# Patient Record
Sex: FEMALE | Race: ASIAN | NOT HISPANIC OR LATINO
[De-identification: names, ages, dates, MRNs, and addresses within clinical notes are randomized per-mention and may not be internally consistent; named-entity substitution may affect disease eponyms.]

---

## 2017-03-13 ENCOUNTER — TRANSCRIPTION ENCOUNTER (OUTPATIENT)
Age: 27
End: 2017-03-13

## 2017-10-14 ENCOUNTER — TRANSCRIPTION ENCOUNTER (OUTPATIENT)
Age: 27
End: 2017-10-14

## 2018-08-10 ENCOUNTER — TRANSCRIPTION ENCOUNTER (OUTPATIENT)
Age: 28
End: 2018-08-10

## 2019-01-10 ENCOUNTER — EMERGENCY (EMERGENCY)
Facility: HOSPITAL | Age: 29
LOS: 1 days | Discharge: HOME | End: 2019-01-10
Attending: EMERGENCY MEDICINE
Payer: COMMERCIAL

## 2019-01-10 VITALS
HEART RATE: 89 BPM | OXYGEN SATURATION: 100 % | SYSTOLIC BLOOD PRESSURE: 122 MMHG | DIASTOLIC BLOOD PRESSURE: 79 MMHG | WEIGHT: 125 LBS | RESPIRATION RATE: 20 BRPM | TEMPERATURE: 98 F

## 2019-01-10 VITALS
HEART RATE: 70 BPM | RESPIRATION RATE: 18 BRPM | SYSTOLIC BLOOD PRESSURE: 110 MMHG | TEMPERATURE: 98 F | OXYGEN SATURATION: 99 % | DIASTOLIC BLOOD PRESSURE: 72 MMHG

## 2019-01-10 LAB
ALBUMIN SERPL ELPH-MCNC: 4.8 G/DL — SIGNIFICANT CHANGE UP (ref 3.3–5)
ALP SERPL-CCNC: 50 U/L — SIGNIFICANT CHANGE UP (ref 40–120)
ALT FLD-CCNC: 10 U/L — SIGNIFICANT CHANGE UP (ref 10–45)
ANION GAP SERPL CALC-SCNC: 12 MMOL/L — SIGNIFICANT CHANGE UP (ref 5–17)
APPEARANCE UR: CLEAR — SIGNIFICANT CHANGE UP
APTT BLD: 36.8 SEC — HIGH (ref 27.5–36.3)
AST SERPL-CCNC: 13 U/L — SIGNIFICANT CHANGE UP (ref 10–40)
BACTERIA # UR AUTO: NEGATIVE — SIGNIFICANT CHANGE UP
BASOPHILS # BLD AUTO: 0.1 K/UL — SIGNIFICANT CHANGE UP (ref 0–0.2)
BASOPHILS NFR BLD AUTO: 0.6 % — SIGNIFICANT CHANGE UP (ref 0–2)
BILIRUB SERPL-MCNC: 0.2 MG/DL — SIGNIFICANT CHANGE UP (ref 0.2–1.2)
BILIRUB UR-MCNC: NEGATIVE — SIGNIFICANT CHANGE UP
BUN SERPL-MCNC: 9 MG/DL — SIGNIFICANT CHANGE UP (ref 7–23)
CALCIUM SERPL-MCNC: 9.6 MG/DL — SIGNIFICANT CHANGE UP (ref 8.4–10.5)
CHLORIDE SERPL-SCNC: 103 MMOL/L — SIGNIFICANT CHANGE UP (ref 96–108)
CO2 SERPL-SCNC: 23 MMOL/L — SIGNIFICANT CHANGE UP (ref 22–31)
COLOR SPEC: COLORLESS — SIGNIFICANT CHANGE UP
CREAT SERPL-MCNC: 0.74 MG/DL — SIGNIFICANT CHANGE UP (ref 0.5–1.3)
DIFF PNL FLD: NEGATIVE — SIGNIFICANT CHANGE UP
EOSINOPHIL # BLD AUTO: 0 K/UL — SIGNIFICANT CHANGE UP (ref 0–0.5)
EOSINOPHIL NFR BLD AUTO: 0.4 % — SIGNIFICANT CHANGE UP (ref 0–6)
EPI CELLS # UR: 1 /HPF — SIGNIFICANT CHANGE UP
GLUCOSE SERPL-MCNC: 93 MG/DL — SIGNIFICANT CHANGE UP (ref 70–99)
GLUCOSE UR QL: NEGATIVE — SIGNIFICANT CHANGE UP
HCG UR QL: NEGATIVE — SIGNIFICANT CHANGE UP
HCT VFR BLD CALC: 41.7 % — SIGNIFICANT CHANGE UP (ref 34.5–45)
HGB BLD-MCNC: 13.6 G/DL — SIGNIFICANT CHANGE UP (ref 11.5–15.5)
HYALINE CASTS # UR AUTO: 0 /LPF — SIGNIFICANT CHANGE UP (ref 0–2)
INR BLD: 1.02 RATIO — SIGNIFICANT CHANGE UP (ref 0.88–1.16)
KETONES UR-MCNC: NEGATIVE — SIGNIFICANT CHANGE UP
LEUKOCYTE ESTERASE UR-ACNC: NEGATIVE — SIGNIFICANT CHANGE UP
LIDOCAIN IGE QN: 30 U/L — SIGNIFICANT CHANGE UP (ref 7–60)
LYMPHOCYTES # BLD AUTO: 18.5 % — SIGNIFICANT CHANGE UP (ref 13–44)
LYMPHOCYTES # BLD AUTO: 2 K/UL — SIGNIFICANT CHANGE UP (ref 1–3.3)
MCHC RBC-ENTMCNC: 26.8 PG — LOW (ref 27–34)
MCHC RBC-ENTMCNC: 32.5 GM/DL — SIGNIFICANT CHANGE UP (ref 32–36)
MCV RBC AUTO: 82.7 FL — SIGNIFICANT CHANGE UP (ref 80–100)
MONOCYTES # BLD AUTO: 0.2 K/UL — SIGNIFICANT CHANGE UP (ref 0–0.9)
MONOCYTES NFR BLD AUTO: 2.3 % — SIGNIFICANT CHANGE UP (ref 2–14)
NEUTROPHILS # BLD AUTO: 8.4 K/UL — HIGH (ref 1.8–7.4)
NEUTROPHILS NFR BLD AUTO: 78.2 % — HIGH (ref 43–77)
NITRITE UR-MCNC: NEGATIVE — SIGNIFICANT CHANGE UP
PH UR: 6 — SIGNIFICANT CHANGE UP (ref 5–8)
PLATELET # BLD AUTO: 508 K/UL — HIGH (ref 150–400)
POTASSIUM SERPL-MCNC: 4 MMOL/L — SIGNIFICANT CHANGE UP (ref 3.5–5.3)
POTASSIUM SERPL-SCNC: 4 MMOL/L — SIGNIFICANT CHANGE UP (ref 3.5–5.3)
PROT SERPL-MCNC: 8.4 G/DL — HIGH (ref 6–8.3)
PROT UR-MCNC: NEGATIVE — SIGNIFICANT CHANGE UP
PROTHROM AB SERPL-ACNC: 11.7 SEC — SIGNIFICANT CHANGE UP (ref 10–12.9)
RBC # BLD: 5.05 M/UL — SIGNIFICANT CHANGE UP (ref 3.8–5.2)
RBC # FLD: 12.2 % — SIGNIFICANT CHANGE UP (ref 10.3–14.5)
RBC CASTS # UR COMP ASSIST: 1 /HPF — SIGNIFICANT CHANGE UP (ref 0–4)
SODIUM SERPL-SCNC: 138 MMOL/L — SIGNIFICANT CHANGE UP (ref 135–145)
SP GR SPEC: 1.01 — SIGNIFICANT CHANGE UP (ref 1.01–1.02)
UROBILINOGEN FLD QL: NEGATIVE — SIGNIFICANT CHANGE UP
WBC # BLD: 10.7 K/UL — HIGH (ref 3.8–10.5)
WBC # FLD AUTO: 10.7 K/UL — HIGH (ref 3.8–10.5)
WBC UR QL: 1 /HPF — SIGNIFICANT CHANGE UP (ref 0–5)

## 2019-01-10 PROCEDURE — 74177 CT ABD & PELVIS W/CONTRAST: CPT

## 2019-01-10 PROCEDURE — 96374 THER/PROPH/DIAG INJ IV PUSH: CPT | Mod: XU

## 2019-01-10 PROCEDURE — 85730 THROMBOPLASTIN TIME PARTIAL: CPT

## 2019-01-10 PROCEDURE — 81025 URINE PREGNANCY TEST: CPT

## 2019-01-10 PROCEDURE — 85027 COMPLETE CBC AUTOMATED: CPT

## 2019-01-10 PROCEDURE — 96361 HYDRATE IV INFUSION ADD-ON: CPT

## 2019-01-10 PROCEDURE — 83690 ASSAY OF LIPASE: CPT

## 2019-01-10 PROCEDURE — 99284 EMERGENCY DEPT VISIT MOD MDM: CPT | Mod: 25

## 2019-01-10 PROCEDURE — 87086 URINE CULTURE/COLONY COUNT: CPT

## 2019-01-10 PROCEDURE — 85610 PROTHROMBIN TIME: CPT

## 2019-01-10 PROCEDURE — 96375 TX/PRO/DX INJ NEW DRUG ADDON: CPT

## 2019-01-10 PROCEDURE — 74177 CT ABD & PELVIS W/CONTRAST: CPT | Mod: 26

## 2019-01-10 PROCEDURE — 99284 EMERGENCY DEPT VISIT MOD MDM: CPT

## 2019-01-10 PROCEDURE — 81001 URINALYSIS AUTO W/SCOPE: CPT

## 2019-01-10 PROCEDURE — 80053 COMPREHEN METABOLIC PANEL: CPT

## 2019-01-10 RX ORDER — ACETAMINOPHEN 500 MG
1000 TABLET ORAL ONCE
Qty: 0 | Refills: 0 | Status: COMPLETED | OUTPATIENT
Start: 2019-01-10 | End: 2019-01-10

## 2019-01-10 RX ORDER — SODIUM CHLORIDE 9 MG/ML
1000 INJECTION INTRAMUSCULAR; INTRAVENOUS; SUBCUTANEOUS ONCE
Qty: 0 | Refills: 0 | Status: COMPLETED | OUTPATIENT
Start: 2019-01-10 | End: 2019-01-10

## 2019-01-10 RX ORDER — KETOROLAC TROMETHAMINE 30 MG/ML
15 SYRINGE (ML) INJECTION ONCE
Qty: 0 | Refills: 0 | Status: DISCONTINUED | OUTPATIENT
Start: 2019-01-10 | End: 2019-01-10

## 2019-01-10 RX ADMIN — Medication 15 MILLIGRAM(S): at 16:30

## 2019-01-10 RX ADMIN — Medication 1000 MILLIGRAM(S): at 12:50

## 2019-01-10 RX ADMIN — Medication 400 MILLIGRAM(S): at 12:50

## 2019-01-10 RX ADMIN — Medication 15 MILLIGRAM(S): at 16:00

## 2019-01-10 RX ADMIN — Medication 1000 MILLIGRAM(S): at 14:22

## 2019-01-10 RX ADMIN — SODIUM CHLORIDE 1000 MILLILITER(S): 9 INJECTION INTRAMUSCULAR; INTRAVENOUS; SUBCUTANEOUS at 14:20

## 2019-01-10 RX ADMIN — SODIUM CHLORIDE 2000 MILLILITER(S): 9 INJECTION INTRAMUSCULAR; INTRAVENOUS; SUBCUTANEOUS at 12:47

## 2019-01-10 NOTE — ED PROVIDER NOTE - NSFOLLOWUPINSTRUCTIONS_ED_ALL_ED_FT
1. Follow up with your PMD in 24-48 hours.  2. Rest. Keep self hydrated,  bland diet of BRAT diet (bananas, rice, apple, tea). Avoid diary, avoid spicy/fatty foods. Follow up with GI for further evaluation.   3. Return to the ER for fever, chills, vomiting, unable to eat or drink or any other concerns.

## 2019-01-10 NOTE — ED PROVIDER NOTE - MEDICAL DECISION MAKING DETAILS
Attending Elvie Pryor: 29 y/o female presenting with llq pain. pocus shows no evidence of hydro making ureteral colic less likely. no dysuria to suggest pyelo or cystitis. no h/o IBS or Chrons. on PA exam no CMT and pt denies any vaginal discharge or risk factors for STD. will obtain ct scan to further evaluate, likely gi follow up

## 2019-01-10 NOTE — ED ADULT NURSE NOTE - OBJECTIVE STATEMENT
28 year old A&Ox3 female presents ambulatory to ED with chief complaint of worsening LLQ abdominal pain x 2 weeks. No PMH or daily medications. Patient states she gets frequent UTI's but the pain feels "different." Pain LLQ radiates to left lower back, -CVA tenderness. Confirms nausea, decrease in PO intake and recent use of laxatives because of "extreme constipation." Patient states she also noted "blood in her stool." Denies tarry stools. . Denies hematuria, burning upon urination, CP SOB, vomiting, fevers, chills, HA, blurry vision, weakness and fatigue. Abdomen soft nondistended bowel sounds heard in all quadrants, tender to palpation in LLQ and RLQ. VSS patient updated on plan of care. 28 year old A&Ox3 female presents ambulatory to ED with chief complaint of worsening LLQ abdominal pain x 2 weeks. No PMH or daily medications. Patient states she gets frequent UTI's but the pain feels "different." Pain LLQ radiates to left lower back, -CVA tenderness. LMP 1.5 weeks ago, normal period, no abnormal discharge. Confirms nausea, decrease in PO intake and recent use of laxatives because of "extreme constipation." Patient states she also noted "blood in her stool." Denies tarry stools. . Denies hematuria, burning upon urination, CP SOB, vomiting, fevers, chills, HA, blurry vision, weakness and fatigue. Abdomen soft nondistended bowel sounds heard in all quadrants, tender to palpation in LLQ and RLQ. VSS patient updated on plan of care. 28 year old A&Ox3 female presents ambulatory to ED with chief complaint of worsening LLQ abdominal pain x 2 weeks. No PMH or daily medications. Patient states she gets frequent UTI's but the pain feels "different." Pain LLQ radiates to left lower back, -CVA tenderness. LMP 1.5 weeks ago, normal period, no abnormal discharge. Confirms nausea, decrease in PO intake and recent use of laxatives because of "extreme constipation." Patient states she also noted "blood in her stool." Denies tarry stools. . Denies hematuria, burning upon urination, CP SOB, vomiting, fevers, chills, HA, blurry vision, weakness and fatigue. Abdomen soft nondistended bowel sounds heard in all quadrants, tender to palpation in LLQ and RLQ. VSS, declines pain medication at this time. Patient updated on plan of care.

## 2019-01-10 NOTE — ED PROVIDER NOTE - OBJECTIVE STATEMENT
28yof no pmhx here with 2 weeks of intermittent LLQ pain associated with constipation. pt reports today on the train to [facility] when had severe "knife stabbing and twisting" pain with the urge to urinate and have BM together. No fever or chills. no nausea or vomiting. currently the pain is 5/10. pt reports hx of constipation and the need to take laxatives intermittently to relieve it but since this 2 weeks, unable to have "good BM". no surgical hx. sexually active with spouse only. no hx of STD. no vaginal discharge or dyspareunia.

## 2019-01-10 NOTE — ED PROVIDER NOTE - PROGRESS NOTE DETAILS
Attending Elvie Pryor: ct scan negative for acute pathology. no evidence of sig ovarian cyst making torsion less likely. pt well appearing. will give GI follow up

## 2019-01-10 NOTE — ED ADULT TRIAGE NOTE - PAIN: PRESENCE, MLM
HH re-certification with [facility], Dr.Mohammed Horton. Patient received SN services.  
complains of pain/discomfort

## 2019-01-10 NOTE — ED PROVIDER NOTE - PHYSICAL EXAMINATION
Attending Elvie Pryor: Gen: NAD, heent: atrauamtic, eomi, perrla, mmm, op pink, uvula midline, neck; nttp, no nuchal rigidity, chest: nttp, no crepitus, cv: rrr, no murmurs, lungs: ctab, abd: soft, mild ttp llq, no peritoneal signs, +BS, no guarding, ext: wwp, neg homans, skin: no rash, neuro: awake and alert, following commands, speech clear, sensation and strength intact, no focal deficits

## 2019-01-10 NOTE — ED ADULT NURSE NOTE - CHPI ED NUR SYMPTOMS NEG
no burning urination/no abdominal distension/no chills/no fever/no diarrhea/no dysuria/no hematuria/no vomiting

## 2019-01-10 NOTE — ED PROVIDER NOTE - ATTENDING CONTRIBUTION TO CARE
Attending MD Elvie Pryor:   I personally have seen and examined this patient.  Physician assistant note reviewed and agree on plan of care and except where noted.  See HPI, PE, and MDM for details.

## 2019-01-11 LAB
CULTURE RESULTS: SIGNIFICANT CHANGE UP
SPECIMEN SOURCE: SIGNIFICANT CHANGE UP

## 2019-01-12 NOTE — ED POST DISCHARGE NOTE - DETAILS
Left message for c/b to discuss contaminated urine and to f/u with PMD for repeat urine culture. -Shawna Garza PA-C Spoke to patient. Informed of results. Currently asymptomatic, advised to follow up for repeat testing for onset urinary symptoms Reba Merino PA-C

## 2021-09-16 ENCOUNTER — APPOINTMENT (OUTPATIENT)
Dept: ANTEPARTUM | Facility: CLINIC | Age: 31
End: 2021-09-16
Payer: COMMERCIAL

## 2021-09-16 ENCOUNTER — ASOB RESULT (OUTPATIENT)
Age: 31
End: 2021-09-16

## 2021-09-16 PROCEDURE — 76801 OB US < 14 WKS SINGLE FETUS: CPT | Mod: 59

## 2021-09-16 PROCEDURE — 76813 OB US NUCHAL MEAS 1 GEST: CPT

## 2021-09-17 ENCOUNTER — LABORATORY RESULT (OUTPATIENT)
Age: 31
End: 2021-09-17

## 2021-10-29 ENCOUNTER — ASOB RESULT (OUTPATIENT)
Age: 31
End: 2021-10-29

## 2021-10-29 ENCOUNTER — APPOINTMENT (OUTPATIENT)
Dept: ANTEPARTUM | Facility: CLINIC | Age: 31
End: 2021-10-29
Payer: COMMERCIAL

## 2021-10-29 PROCEDURE — 76811 OB US DETAILED SNGL FETUS: CPT

## 2021-10-29 PROCEDURE — 99204 OFFICE O/P NEW MOD 45 MIN: CPT | Mod: 25

## 2021-11-10 ENCOUNTER — APPOINTMENT (OUTPATIENT)
Dept: ANTEPARTUM | Facility: CLINIC | Age: 31
End: 2021-11-10

## 2021-11-11 ENCOUNTER — APPOINTMENT (OUTPATIENT)
Dept: ANTEPARTUM | Facility: CLINIC | Age: 31
End: 2021-11-11

## 2021-11-15 ENCOUNTER — APPOINTMENT (OUTPATIENT)
Dept: ANTEPARTUM | Facility: CLINIC | Age: 31
End: 2021-11-15
Payer: COMMERCIAL

## 2021-11-15 ENCOUNTER — APPOINTMENT (OUTPATIENT)
Dept: ANTEPARTUM | Facility: CLINIC | Age: 31
End: 2021-11-15

## 2021-11-15 ENCOUNTER — ASOB RESULT (OUTPATIENT)
Age: 31
End: 2021-11-15

## 2021-11-15 PROCEDURE — 76816 OB US FOLLOW-UP PER FETUS: CPT

## 2021-11-16 ENCOUNTER — APPOINTMENT (OUTPATIENT)
Dept: ANTEPARTUM | Facility: CLINIC | Age: 31
End: 2021-11-16

## 2021-11-29 ENCOUNTER — TRANSCRIPTION ENCOUNTER (OUTPATIENT)
Age: 31
End: 2021-11-29

## 2022-01-05 ENCOUNTER — APPOINTMENT (OUTPATIENT)
Dept: ANTEPARTUM | Facility: CLINIC | Age: 32
End: 2022-01-05
Payer: COMMERCIAL

## 2022-01-05 ENCOUNTER — ASOB RESULT (OUTPATIENT)
Age: 32
End: 2022-01-05

## 2022-01-05 PROCEDURE — 76816 OB US FOLLOW-UP PER FETUS: CPT

## 2022-01-28 ENCOUNTER — ASOB RESULT (OUTPATIENT)
Age: 32
End: 2022-01-28

## 2022-01-28 ENCOUNTER — APPOINTMENT (OUTPATIENT)
Dept: ANTEPARTUM | Facility: CLINIC | Age: 32
End: 2022-01-28
Payer: COMMERCIAL

## 2022-01-28 PROCEDURE — 76816 OB US FOLLOW-UP PER FETUS: CPT

## 2022-02-22 ENCOUNTER — APPOINTMENT (OUTPATIENT)
Dept: ANTEPARTUM | Facility: CLINIC | Age: 32
End: 2022-02-22
Payer: COMMERCIAL

## 2022-02-22 ENCOUNTER — ASOB RESULT (OUTPATIENT)
Age: 32
End: 2022-02-22

## 2022-02-22 PROCEDURE — 76816 OB US FOLLOW-UP PER FETUS: CPT

## 2022-03-18 ENCOUNTER — INPATIENT (INPATIENT)
Facility: HOSPITAL | Age: 32
LOS: 2 days | Discharge: HOME | End: 2022-03-21
Attending: OBSTETRICS & GYNECOLOGY | Admitting: OBSTETRICS & GYNECOLOGY
Payer: COMMERCIAL

## 2022-03-18 VITALS — TEMPERATURE: 99 F

## 2022-03-18 DIAGNOSIS — O26.899 OTHER SPECIFIED PREGNANCY RELATED CONDITIONS, UNSPECIFIED TRIMESTER: ICD-10-CM

## 2022-03-18 DIAGNOSIS — Z34.80 ENCOUNTER FOR SUPERVISION OF OTHER NORMAL PREGNANCY, UNSPECIFIED TRIMESTER: ICD-10-CM

## 2022-03-18 DIAGNOSIS — Z3A.00 WEEKS OF GESTATION OF PREGNANCY NOT SPECIFIED: ICD-10-CM

## 2022-03-18 DIAGNOSIS — Z3A.39 39 WEEKS GESTATION OF PREGNANCY: ICD-10-CM

## 2022-03-18 LAB
BASOPHILS # BLD AUTO: 0.05 K/UL — SIGNIFICANT CHANGE UP (ref 0–0.2)
BASOPHILS NFR BLD AUTO: 0.4 % — SIGNIFICANT CHANGE UP (ref 0–2)
BLD GP AB SCN SERPL QL: NEGATIVE — SIGNIFICANT CHANGE UP
EOSINOPHIL # BLD AUTO: 0.18 K/UL — SIGNIFICANT CHANGE UP (ref 0–0.5)
EOSINOPHIL NFR BLD AUTO: 1.4 % — SIGNIFICANT CHANGE UP (ref 0–6)
HCT VFR BLD CALC: 40.5 % — SIGNIFICANT CHANGE UP (ref 34.5–45)
HGB BLD-MCNC: 12.8 G/DL — SIGNIFICANT CHANGE UP (ref 11.5–15.5)
IMM GRANULOCYTES NFR BLD AUTO: 0.8 % — SIGNIFICANT CHANGE UP (ref 0–1.5)
LYMPHOCYTES # BLD AUTO: 23.7 % — SIGNIFICANT CHANGE UP (ref 13–44)
LYMPHOCYTES # BLD AUTO: 3.13 K/UL — SIGNIFICANT CHANGE UP (ref 1–3.3)
MCHC RBC-ENTMCNC: 26.4 PG — LOW (ref 27–34)
MCHC RBC-ENTMCNC: 31.6 GM/DL — LOW (ref 32–36)
MCV RBC AUTO: 83.7 FL — SIGNIFICANT CHANGE UP (ref 80–100)
MONOCYTES # BLD AUTO: 1.32 K/UL — HIGH (ref 0–0.9)
MONOCYTES NFR BLD AUTO: 10 % — SIGNIFICANT CHANGE UP (ref 2–14)
NEUTROPHILS # BLD AUTO: 8.45 K/UL — HIGH (ref 1.8–7.4)
NEUTROPHILS NFR BLD AUTO: 63.7 % — SIGNIFICANT CHANGE UP (ref 43–77)
NRBC # BLD: 0 /100 WBCS — SIGNIFICANT CHANGE UP (ref 0–0)
PLATELET # BLD AUTO: 384 K/UL — SIGNIFICANT CHANGE UP (ref 150–400)
RBC # BLD: 4.84 M/UL — SIGNIFICANT CHANGE UP (ref 3.8–5.2)
RBC # FLD: 18.5 % — HIGH (ref 10.3–14.5)
RH IG SCN BLD-IMP: NEGATIVE — SIGNIFICANT CHANGE UP
RH IG SCN BLD-IMP: NEGATIVE — SIGNIFICANT CHANGE UP
SARS-COV-2 RNA SPEC QL NAA+PROBE: SIGNIFICANT CHANGE UP
WBC # BLD: 13.23 K/UL — HIGH (ref 3.8–10.5)
WBC # FLD AUTO: 13.23 K/UL — HIGH (ref 3.8–10.5)

## 2022-03-18 RX ORDER — INFLUENZA VIRUS VACCINE 15; 15; 15; 15 UG/.5ML; UG/.5ML; UG/.5ML; UG/.5ML
0.5 SUSPENSION INTRAMUSCULAR ONCE
Refills: 0 | Status: DISCONTINUED | OUTPATIENT
Start: 2022-03-18 | End: 2022-03-21

## 2022-03-18 RX ORDER — SODIUM CHLORIDE 9 MG/ML
1000 INJECTION, SOLUTION INTRAVENOUS
Refills: 0 | Status: DISCONTINUED | OUTPATIENT
Start: 2022-03-18 | End: 2022-03-20

## 2022-03-18 RX ORDER — CITRIC ACID/SODIUM CITRATE 300-500 MG
15 SOLUTION, ORAL ORAL EVERY 6 HOURS
Refills: 0 | Status: DISCONTINUED | OUTPATIENT
Start: 2022-03-18 | End: 2022-03-20

## 2022-03-18 RX ORDER — OXYTOCIN 10 UNIT/ML
333.33 VIAL (ML) INJECTION
Qty: 20 | Refills: 0 | Status: DISCONTINUED | OUTPATIENT
Start: 2022-03-18 | End: 2022-03-21

## 2022-03-18 NOTE — OB PROVIDER H&P - ASSESSMENT
A/P: 31yoF  @39 weeks NATALIA 3/25/22 admitted for IOL secondary to abnormal BPP (). Cat 1 tracing.   - Admit to L&D  - Routine Labs. IVF. Covid swab  - EFM: Cat 1, continuous monitoring   - IOL with BC  - For CB when able    - GBS negative   - Anesthesia consult - pt desires epidural   - D/w Dr. Deena Starks, [facility]

## 2022-03-18 NOTE — OB RN PATIENT PROFILE - BREASTFEEDING IS BETTER ESTABLISHED WHEN INFANTS ARE ROOMING IN WITH PARENT (23/24 HOURS OF THE DAY)
PD HPI SKIN





- Stated complaint


Stated Complaint: BLEEDING INCISION/ POST 





- Chief complaint


Chief Complaint: Wound





- History obtained from


History obtained from: Patient





- History of Present Illness


Timing - onset: Today


Timing - details: Abrupt onset


Pain level now: 0


Location: Abdomen


Quality / character: Other (bleeding).  No: Itchy, Painful, Burning, Discolored,

Raised, Vesicular, Crusted, Swelling


Associated symptoms: No: Fever, Abd pain


Similar symptoms before: Has not had sx before


Recently seen: Surgery





- Additional information


Additional information: 





bleeding from incision site tonight when bathing her child. bleeding is from 

left lateral aspect of  site. patient had  .





Review of Systems


Constitutional: reports: Reviewed and negative


Skin: reports: Laceration (s)


Endocrine: denies: Easy bruising / bleeding





PD PAST MEDICAL HISTORY





- Past Medical History


Past Medical History: Yes


Cardiovascular: None


Respiratory: None


Neuro: None


Endocrine/Autoimmune: None


GI: GERD


GYN: None


: None


HEENT: None


Psych: None


Musculoskeletal: None


Derm: None





- Past Surgical History


Past Surgical History: Yes


/GYN:  section


HEENT: Tonsil/Adenoidectomy





- Present Medications


Home Medications: 


                                Ambulatory Orders











 Medication  Instructions  Recorded  Confirmed


 


Amox/Clav 875/125 [Augmentin] 1 each PO Q12H #14 tablet 18 


 


Hydrocodone/Acetaminophen 1 - 2 each PO Q6H PRN #14 tablet 18 





[Hydrocodon-Acetaminophen 5-325]   














- Allergies


Allergies/Adverse Reactions: 


                                    Allergies











Allergy/AdvReac Type Severity Reaction Status Date / Time


 


codeine Allergy  Hives Verified 20 23:32














- Social History


Does the pt smoke?: No


Smoking Status: Never smoker


Does the pt drink ETOH?: Yes


Does the pt have substance abuse?: No





- Immunizations


Immunizations are current?: Yes





- POLST


Patient has POLST: No





PD ED PE NORMAL





- Vitals


Vital signs reviewed: Yes





- General


General: Alert and oriented X 3, No acute distress, Well developed/nourished





- Abdomen


Abdomen: Soft, Non tender, Other (left-most 3-4 cm of  incision site 

has brisk, non-pulsatile dark red beeding from wound)





Results





- Vitals


Vitals: 





                                     Oxygen











O2 Source                      Room air

















PD MEDICAL DECISION MAKING





- ED course


Complexity details: considered differential, d/w patient, d/w family


ED course: 





D/W Dr. Bennett, recommends silver nitrate to cauterize wound and then steri-

strip entire wound. 


I injected 2%lidocaine with epinephrine into edges of wound and then applied 

three layers of silver nitrate to the bleeding edges of the wound, resulting in 

good hemostasis. strei strip then applied by tech to entire wound





Departure





- Departure


Disposition: 01 Home, Self Care


Clinical Impression: 


 Postoperative bleeding from incision





Condition: Good


Instructions:  ED Wound Check Post Op Bleeding


Follow-Up: 


Danna Bennett MD [Provider Admit Priv/Credential] - 


Discharge Date/Time: 20 04:52
Statement Selected

## 2022-03-18 NOTE — OB RN PATIENT PROFILE - FALL HARM RISK - UNIVERSAL INTERVENTIONS
Bed in lowest position, wheels locked, appropriate side rails in place/Call bell, personal items and telephone in reach/Instruct patient to call for assistance before getting out of bed or chair/Non-slip footwear when patient is out of bed/[place] to call system/Physically safe environment - no spills, clutter or unnecessary equipment/Purposeful Proactive Rounding/Room/bathroom lighting operational, light cord in reach Bed in lowest position, wheels locked, appropriate side rails in place/Call bell, personal items and telephone in reach/Instruct patient to call for assistance before getting out of bed or chair/Non-slip footwear when patient is out of bed/[place] to call system/Physically safe environment - no spills, clutter or unnecessary equipment/Purposeful Proactive Rounding/Room/bathroom lighting operational, light cord in reach

## 2022-03-18 NOTE — OB PROVIDER H&P - ATTENDING COMMENTS
I agree with SIMRAN Starks's assessment and plan.  Pt was seen in office today and c/o decreased fetal movement.  BPP was done in office by Dr Domingo which was 6/8 with -2 for lack of fetal breathing.  Given that pt is 39 wks with nonreassuring fetal testing will proceed with induction of labor for BPP 6/8.  Plan was discussed with pt.  Admit to L+D  IOL with buccal cytotec  CB placement when able  analgesia prn    ALEXIS Scales

## 2022-03-18 NOTE — OB RN TRIAGE NOTE - FALL HARM RISK - UNIVERSAL INTERVENTIONS
Bed in lowest position, wheels locked, appropriate side rails in place/Call bell, personal items and telephone in reach/Instruct patient to call for assistance before getting out of bed or chair/Non-slip footwear when patient is out of bed/[place] to call system/Physically safe environment - no spills, clutter or unnecessary equipment/Purposeful Proactive Rounding/Room/bathroom lighting operational, light cord in reach

## 2022-03-18 NOTE — OB PROVIDER H&P - HISTORY OF PRESENT ILLNESS
OB PA Admission H&P    31yoF  @39 weeks NATALIA 3/25/22 presents to triage from office with BPP 6/8 (no practice breathing). Pt endorses +FM. Denies -LOF. -CTXs. -VB. Pt denies any other concerns.    – PNC: GBS negative. EFW 3250  – OBHx: primigravid   – GynHx: denies h/o fibroids, ovarian cysts, abnl pap smears, STDs  – PMH: ?thrombocytosis (platelets 565/during pregnancy) denies h/o HTN, DM, asthma, thyroid disorders, h/o blood transfusions   – PSH: denies  – Psych: denies anxiety/depression   – Social: denies alcohol/tobacco/drug use in pregnancy   – Meds: PNV   – Allergies: NKDA  – Will accept blood transfusions: Yes      Vital Signs Last 24 Hrs  HR: 100 (18 Mar 2022 16:31) (100 - 100)  BP: 113/55 (18 Mar 2022 16:31) (113/55 - 113/55)  RR: 18 (18 Mar 2022 16:31) (18 - 18)      Gen: NAD  CV: RRR  Lungs: CTA b/l   Abd: gravid, non-tender  Ext: BLE non-edematous, no calf tenderness b/l       – VE: 0/0/-3  – FHT: baseline 125, mod variability, +accels, -decels  – [place]: irregular   – EFW: 3250   – Sono: vertex

## 2022-03-18 NOTE — OB RN PATIENT PROFILE - ALERT: PERTINENT HISTORY
1st Trimester Sonogram/20 Week Level II Sonogram/BioPhysical Profile(s) 1st Trimester Sonogram/20 Week Level II Sonogram/BioPhysical Profile(s)/Ultra Screen at 12 Weeks

## 2022-03-19 LAB
COVID-19 SPIKE DOMAIN AB INTERP: POSITIVE
COVID-19 SPIKE DOMAIN ANTIBODY RESULT: >250 U/ML — HIGH
SARS-COV-2 IGG+IGM SERPL QL IA: >250 U/ML — HIGH
SARS-COV-2 IGG+IGM SERPL QL IA: POSITIVE
T PALLIDUM AB TITR SER: NEGATIVE — SIGNIFICANT CHANGE UP

## 2022-03-19 RX ORDER — OXYTOCIN 10 UNIT/ML
VIAL (ML) INJECTION
Qty: 30 | Refills: 0 | Status: DISCONTINUED | OUTPATIENT
Start: 2022-03-19 | End: 2022-03-20

## 2022-03-19 RX ADMIN — Medication 2 MILLIUNIT(S)/MIN: at 20:54

## 2022-03-19 NOTE — OB RN DELIVERY SUMMARY - NSSELHIDDEN_OBGYN_ALL_OB_FT
[NS_DeliveryAttending1_OBGYN_ALL_OB_FT:[id number]],[NS_DeliveryAssist1_OBGYN_ALL_OB_FT:[id number]],[NS_DeliveryRN_OBGYN_ALL_OB_FT:[id number]]

## 2022-03-19 NOTE — PRE-ANESTHESIA EVALUATION ADULT - NSANTHASARD_GEN_ALL_CORE
Problem: Falls - Risk of:  Goal: Will remain free from falls  Will remain free from falls   Outcome: Met This Shift
Problem: Pain:  Goal: Control of acute pain  Control of acute pain   Outcome: Met This Shift
2E
2E

## 2022-03-19 NOTE — OB RN DELIVERY SUMMARY - NS_SEPSISRSKCALC_OBGYN_ALL_OB_FT
EOS calculated successfully. EOS Risk Factor: 0.5/1000 live births ([facility] national incidence); GA=39w2d; Temp=98.8; ROM=5.617; GBS='Negative'; Antibiotics='No antibiotics or any antibiotics < 2 hrs prior to birth'

## 2022-03-19 NOTE — PRE-ANESTHESIA EVALUATION ADULT - NSANTHOSAYNRD_GEN_A_CORE
No. MEAGHAN screening performed.  STOP BANG Legend: 0-2 = LOW Risk; 3-4 = INTERMEDIATE Risk; 5-8 = HIGH Risk
No. MEAGHAN screening performed.  STOP BANG Legend: 0-2 = LOW Risk; 3-4 = INTERMEDIATE Risk; 5-8 = HIGH Risk

## 2022-03-19 NOTE — OB PROVIDER LABOR PROGRESS NOTE - NS_SUBJECTIVE/OBJECTIVE_OBGYN_ALL_OB_FT
Pt seen for placement of cervical aguillon balloon with sterile techniques; 60cc NS instilled into uterine/vaginal balloons; placed w/o incidence; pt tolerated procedure well
pt is well.  adequate pain relief
Pt evaluated for cervical change

## 2022-03-19 NOTE — OB PROVIDER LABOR PROGRESS NOTE - ASSESSMENT
A/P:  -continue current management    
a/p:  Primagravida at term  IOL due to borderline antepartum testing results, and decreased fetal movements  Pt has a cervical balloon in place currently, and receiving buccal misoprostol.  Pt and her  were fully counseled re IOL process and all of their q's were answered.
called for epidural  cw buccal cytotec    dw Dr.Davidson Melvin Black PGY3

## 2022-03-19 NOTE — OB RN DELIVERY SUMMARY - NS_LABORCHARACTER_OBGYN_ALL_OB
Induction of labor-Medicinal/External electronic FM Induction of labor-AROM/Induction of labor-Medicinal/Augmentation of labor/External electronic FM

## 2022-03-20 LAB — KLEIHAUER-BETKE CALCULATION: 0 % — SIGNIFICANT CHANGE UP (ref 0–0.3)

## 2022-03-20 RX ORDER — OXYCODONE HYDROCHLORIDE 5 MG/1
5 TABLET ORAL
Refills: 0 | Status: DISCONTINUED | OUTPATIENT
Start: 2022-03-20 | End: 2022-03-21

## 2022-03-20 RX ORDER — IBUPROFEN 200 MG
600 TABLET ORAL EVERY 6 HOURS
Refills: 0 | Status: COMPLETED | OUTPATIENT
Start: 2022-03-20 | End: 2023-02-16

## 2022-03-20 RX ORDER — DIBUCAINE 1 %
1 OINTMENT (GRAM) RECTAL EVERY 6 HOURS
Refills: 0 | Status: DISCONTINUED | OUTPATIENT
Start: 2022-03-20 | End: 2022-03-21

## 2022-03-20 RX ORDER — OXYTOCIN 10 UNIT/ML
333.33 VIAL (ML) INJECTION
Qty: 20 | Refills: 0 | Status: DISCONTINUED | OUTPATIENT
Start: 2022-03-20 | End: 2022-03-21

## 2022-03-20 RX ORDER — ACETAMINOPHEN 500 MG
975 TABLET ORAL
Refills: 0 | Status: DISCONTINUED | OUTPATIENT
Start: 2022-03-20 | End: 2022-03-21

## 2022-03-20 RX ORDER — HYDROCORTISONE 1 %
1 OINTMENT (GRAM) TOPICAL EVERY 6 HOURS
Refills: 0 | Status: DISCONTINUED | OUTPATIENT
Start: 2022-03-20 | End: 2022-03-21

## 2022-03-20 RX ORDER — AER TRAVELER 0.5 G/1
1 SOLUTION RECTAL; TOPICAL EVERY 4 HOURS
Refills: 0 | Status: DISCONTINUED | OUTPATIENT
Start: 2022-03-20 | End: 2022-03-21

## 2022-03-20 RX ORDER — KETOROLAC TROMETHAMINE 30 MG/ML
30 SYRINGE (ML) INJECTION ONCE
Refills: 0 | Status: DISCONTINUED | OUTPATIENT
Start: 2022-03-20 | End: 2022-03-20

## 2022-03-20 RX ORDER — SODIUM CHLORIDE 9 MG/ML
3 INJECTION INTRAMUSCULAR; INTRAVENOUS; SUBCUTANEOUS EVERY 8 HOURS
Refills: 0 | Status: DISCONTINUED | OUTPATIENT
Start: 2022-03-20 | End: 2022-03-21

## 2022-03-20 RX ORDER — PRAMOXINE HYDROCHLORIDE 150 MG/15G
1 AEROSOL, FOAM RECTAL EVERY 4 HOURS
Refills: 0 | Status: DISCONTINUED | OUTPATIENT
Start: 2022-03-20 | End: 2022-03-21

## 2022-03-20 RX ORDER — DIPHENHYDRAMINE HCL 50 MG
25 CAPSULE ORAL EVERY 6 HOURS
Refills: 0 | Status: DISCONTINUED | OUTPATIENT
Start: 2022-03-20 | End: 2022-03-21

## 2022-03-20 RX ORDER — OXYCODONE HYDROCHLORIDE 5 MG/1
5 TABLET ORAL ONCE
Refills: 0 | Status: DISCONTINUED | OUTPATIENT
Start: 2022-03-20 | End: 2022-03-21

## 2022-03-20 RX ORDER — IBUPROFEN 200 MG
600 TABLET ORAL EVERY 6 HOURS
Refills: 0 | Status: DISCONTINUED | OUTPATIENT
Start: 2022-03-20 | End: 2022-03-21

## 2022-03-20 RX ORDER — MAGNESIUM HYDROXIDE 400 MG/1
30 TABLET, CHEWABLE ORAL
Refills: 0 | Status: DISCONTINUED | OUTPATIENT
Start: 2022-03-20 | End: 2022-03-21

## 2022-03-20 RX ORDER — LANOLIN
1 OINTMENT (GRAM) TOPICAL EVERY 6 HOURS
Refills: 0 | Status: DISCONTINUED | OUTPATIENT
Start: 2022-03-20 | End: 2022-03-21

## 2022-03-20 RX ORDER — BENZOCAINE 10 %
1 GEL (GRAM) MUCOUS MEMBRANE EVERY 6 HOURS
Refills: 0 | Status: DISCONTINUED | OUTPATIENT
Start: 2022-03-20 | End: 2022-03-21

## 2022-03-20 RX ORDER — TETANUS TOXOID, REDUCED DIPHTHERIA TOXOID AND ACELLULAR PERTUSSIS VACCINE, ADSORBED 5; 2.5; 8; 8; 2.5 [IU]/.5ML; [IU]/.5ML; UG/.5ML; UG/.5ML; UG/.5ML
0.5 SUSPENSION INTRAMUSCULAR ONCE
Refills: 0 | Status: DISCONTINUED | OUTPATIENT
Start: 2022-03-20 | End: 2022-03-21

## 2022-03-20 RX ORDER — SIMETHICONE 80 MG/1
80 TABLET, CHEWABLE ORAL EVERY 4 HOURS
Refills: 0 | Status: DISCONTINUED | OUTPATIENT
Start: 2022-03-20 | End: 2022-03-21

## 2022-03-20 RX ADMIN — Medication 975 MILLIGRAM(S): at 11:59

## 2022-03-20 RX ADMIN — Medication 1000 MILLIUNIT(S)/MIN: at 01:51

## 2022-03-20 RX ADMIN — Medication 600 MILLIGRAM(S): at 09:33

## 2022-03-20 RX ADMIN — Medication 600 MILLIGRAM(S): at 15:09

## 2022-03-20 RX ADMIN — Medication 600 MILLIGRAM(S): at 21:30

## 2022-03-20 RX ADMIN — Medication 975 MILLIGRAM(S): at 12:29

## 2022-03-20 RX ADMIN — Medication 975 MILLIGRAM(S): at 18:12

## 2022-03-20 RX ADMIN — Medication 975 MILLIGRAM(S): at 05:27

## 2022-03-20 RX ADMIN — Medication 30 MILLIGRAM(S): at 03:03

## 2022-03-20 RX ADMIN — Medication 600 MILLIGRAM(S): at 20:54

## 2022-03-20 RX ADMIN — Medication 975 MILLIGRAM(S): at 23:23

## 2022-03-20 RX ADMIN — Medication 600 MILLIGRAM(S): at 15:39

## 2022-03-20 RX ADMIN — Medication 1 TABLET(S): at 11:59

## 2022-03-20 RX ADMIN — Medication 600 MILLIGRAM(S): at 09:03

## 2022-03-20 RX ADMIN — Medication 975 MILLIGRAM(S): at 18:42

## 2022-03-20 RX ADMIN — Medication 975 MILLIGRAM(S): at 06:06

## 2022-03-20 NOTE — OB NEONATOLOGY/PEDIATRICIAN DELIVERY SUMMARY - NSPEDSNEONOTESA_OBGYN_ALL_OB_FT
Peds called to attend the delivery for meconium stained fluids of 39.1wk female born via   to a 30 y/o  blood type O- mother and received rhogam at 28wks. No significant maternal or prenatal history. PNL -/-/NR/I, GBS - on 3/4. AROM at 20:00 with thin meconium fluids. Baby emerged vigorous, crying, was w/d/s/s with APGAR of 9 at 1 minute. cord clamping was delayed and baby was immediately allowed to do skin to skin with mom. Mom plans to initiate formula feed, consents Hep B vaccine.  EOS 0.13.  Highest maternal temp 37.1C

## 2022-03-20 NOTE — OB PROVIDER DELIVERY SUMMARY - NSSELHIDDEN_OBGYN_ALL_OB_FT
[NS_DeliveryAttending1_OBGYN_ALL_OB_FT:[id number]],[NS_DeliveryAssist1_OBGYN_ALL_OB_FT:[id number]]

## 2022-03-20 NOTE — OB PROVIDER DELIVERY SUMMARY - NSPROVIDERDELIVERYNOTE_OBGYN_ALL_OB_FT
Spontaneous vaginal delivery of liveborn infant from ISMA position. Head, shoulders, and body delivered easily. Infant was suctioned. Heavy mec. Peds present at delivery. 1 minute delayed cord clamping was performed. Cord clamped and cut and infant passed to mother. Placenta delivered intact with a 3 vessel cord. Fundal massage was given and uterine fundus was found to be firm. Vaginal exam revealed an intact cervix, vaginal walls, and sulci. Patient had a 2nd degree laceration in the perineum that was repaired with 3.0 vicryl rapide suture. Excellent hemostasis was noted. Patient was stable. Count was correct x2. .    Sharyn Williamson  PGY-1

## 2022-03-21 ENCOUNTER — TRANSCRIPTION ENCOUNTER (OUTPATIENT)
Age: 32
End: 2022-03-21

## 2022-03-21 VITALS
HEART RATE: 91 BPM | DIASTOLIC BLOOD PRESSURE: 79 MMHG | SYSTOLIC BLOOD PRESSURE: 118 MMHG | RESPIRATION RATE: 18 BRPM | TEMPERATURE: 98 F | OXYGEN SATURATION: 97 %

## 2022-03-21 PROCEDURE — 86900 BLOOD TYPING SEROLOGIC ABO: CPT

## 2022-03-21 PROCEDURE — 85460 HEMOGLOBIN FETAL: CPT

## 2022-03-21 PROCEDURE — 86769 SARS-COV-2 COVID-19 ANTIBODY: CPT

## 2022-03-21 PROCEDURE — 86780 TREPONEMA PALLIDUM: CPT

## 2022-03-21 PROCEDURE — 86850 RBC ANTIBODY SCREEN: CPT

## 2022-03-21 PROCEDURE — 85025 COMPLETE CBC W/AUTO DIFF WBC: CPT

## 2022-03-21 PROCEDURE — 86901 BLOOD TYPING SEROLOGIC RH(D): CPT

## 2022-03-21 PROCEDURE — G0463: CPT

## 2022-03-21 PROCEDURE — 36415 COLL VENOUS BLD VENIPUNCTURE: CPT

## 2022-03-21 PROCEDURE — 87635 SARS-COV-2 COVID-19 AMP PRB: CPT

## 2022-03-21 PROCEDURE — 59025 FETAL NON-STRESS TEST: CPT

## 2022-03-21 PROCEDURE — 59050 FETAL MONITOR W/REPORT: CPT

## 2022-03-21 RX ORDER — BENZOCAINE 10 %
1 GEL (GRAM) MUCOUS MEMBRANE
Qty: 0 | Refills: 0 | DISCHARGE
Start: 2022-03-21

## 2022-03-21 RX ORDER — AER TRAVELER 0.5 G/1
1 SOLUTION RECTAL; TOPICAL
Qty: 0 | Refills: 0 | DISCHARGE
Start: 2022-03-21

## 2022-03-21 RX ORDER — LANOLIN
1 OINTMENT (GRAM) TOPICAL
Qty: 0 | Refills: 0 | DISCHARGE
Start: 2022-03-21

## 2022-03-21 RX ORDER — SENNA PLUS 8.6 MG/1
2 TABLET ORAL DAILY
Refills: 0 | Status: DISCONTINUED | OUTPATIENT
Start: 2022-03-21 | End: 2022-03-21

## 2022-03-21 RX ORDER — POLYETHYLENE GLYCOL 3350 17 G/17G
17 POWDER, FOR SOLUTION ORAL DAILY
Refills: 0 | Status: DISCONTINUED | OUTPATIENT
Start: 2022-03-21 | End: 2022-03-21

## 2022-03-21 RX ORDER — DIBUCAINE 1 %
1 OINTMENT (GRAM) RECTAL
Qty: 0 | Refills: 0 | DISCHARGE
Start: 2022-03-21

## 2022-03-21 RX ORDER — ACETAMINOPHEN 500 MG
3 TABLET ORAL
Qty: 0 | Refills: 0 | DISCHARGE
Start: 2022-03-21

## 2022-03-21 RX ORDER — IBUPROFEN 200 MG
1 TABLET ORAL
Qty: 0 | Refills: 0 | DISCHARGE
Start: 2022-03-21

## 2022-03-21 RX ADMIN — Medication 975 MILLIGRAM(S): at 12:22

## 2022-03-21 RX ADMIN — Medication 600 MILLIGRAM(S): at 08:42

## 2022-03-21 RX ADMIN — Medication 975 MILLIGRAM(S): at 00:00

## 2022-03-21 RX ADMIN — Medication 1 TABLET(S): at 11:22

## 2022-03-21 RX ADMIN — Medication 600 MILLIGRAM(S): at 09:42

## 2022-03-21 RX ADMIN — Medication 975 MILLIGRAM(S): at 05:59

## 2022-03-21 RX ADMIN — Medication 975 MILLIGRAM(S): at 11:22

## 2022-03-21 NOTE — DISCHARGE NOTE OB - HOSPITAL COURSE
Pt underwent normal vaginal delivery. Uncomplicated postpartum course. Pt met all milestones in regards to postop labs, activities, diet and pain control. Pt was discharged on PPD #21 in stable condition. All follow up and instructions discussed.

## 2022-03-21 NOTE — PROGRESS NOTE ADULT - SUBJECTIVE AND OBJECTIVE BOX
Ob Attg daily progress note:    Pt delivered vaginally earlier this am   She's now well, and offers no c/o.  no excessive bleeding.  cramps are mild.    Vital Signs Last 24 Hrs  T(C): 36.7 (20 Mar 2022 09:00), Max: 37.0 (19 Mar 2022 18:19)  T(F): 98 (20 Mar 2022 09:00), Max: 98.6 (19 Mar 2022 18:19)  HR: 82 (20 Mar 2022 09:00) (60 - 117)  BP: 107/74 (20 Mar 2022 09:00) (104/63 - 142/63)  BP(mean): 83 (20 Mar 2022 04:00) (83 - 90)  RR: 18 (20 Mar 2022 09:00) (18 - 18)  SpO2: 98% (20 Mar 2022 09:00) (80% - 100%)    Abd is soft, NT, ND.  uterine fundus is firm and NT.  Ext: normal. trace edema.  no tenderness  Perineum:  intact.  lochia is WNL.  Back: normal.  Lungs / Heart: clear and normal.    A/P:    s/p vag delivery.  stable and well today.   Routine PP care.  
OB Progress Note:  PPD#1    S: 32yo  PPD#1 s/p . Patient feels well. Pain is well controlled, tolerating regular diet, passing flatus, voiding spontaneously, ambulating without difficulty. Denies heavy vaginal bleeding, CP/SOB, N/V, lightheadedness/dizziness.     O:  Vitals:  Vital Signs Last 24 Hrs  T(C): 36.6 (21 Mar 2022 05:31), Max: 36.7 (20 Mar 2022 09:00)  T(F): 97.8 (21 Mar 2022 05:31), Max: 98.1 (20 Mar 2022 17:29)  HR: 91 (21 Mar 2022 05:31) (75 - 91)  BP: 118/79 (21 Mar 2022 05:31) (107/74 - 127/81)  BP(mean): --  RR: 18 (21 Mar 2022 05:31) (18 - 18)  SpO2: 97% (21 Mar 2022 05:31) (97% - 99%)    MEDICATIONS  (STANDING):  acetaminophen     Tablet .. 975 milliGRAM(s) Oral <User Schedule>  diphtheria/tetanus/pertussis (acellular) Vaccine (ADAcel) 0.5 milliLiter(s) IntraMuscular once  ibuprofen  Tablet. 600 milliGRAM(s) Oral every 6 hours  influenza   Vaccine 0.5 milliLiter(s) IntraMuscular once  oxytocin Infusion 333.333 milliUNIT(s)/Min (1000 mL/Hr) IV Continuous <Continuous>  oxytocin Infusion 333.333 milliUNIT(s)/Min (1000 mL/Hr) IV Continuous <Continuous>  prenatal multivitamin 1 Tablet(s) Oral daily  sodium chloride 0.9% lock flush 3 milliLiter(s) IV Push every 8 hours      Labs:  Blood type: O Negative  Rubella IgG: RPR: Negative                          12.8   13.23<H> >-----------< 384    (  @ 17:45 )             40.5        Physical Exam:  General: NAD  Abdomen: soft, non-tender, non-distended, fundus firm  Vaginal: No heavy vaginal bleeding  Extremities: No erythema/edema

## 2022-03-21 NOTE — PROGRESS NOTE ADULT - ASSESSMENT
A/P: 32yo PPD#1 s/p .  Patient is stable and doing well post-partum.   - Pain well controlled, continue current pain regimen  - Increase ambulation, SCDs when not ambulating  - Continue regular diet    Cora Dunn, PGY1

## 2022-03-21 NOTE — DISCHARGE NOTE OB - NS MD DC FALL RISK RISK
For information on Fall & Injury Prevention, visit: [email or web address] OR  [email or web address] OR  https://www.cdc.gov/steadi/patient.html

## 2022-03-21 NOTE — DISCHARGE NOTE OB - ADDITIONAL INSTRUCTIONS
Postpartum visit via telemed at 2 weeks and then an in office visit between 4-6 weeks.  Call for any issues or concerns. Please do not drive unless you are completely pain free or not requiring any narcotics.

## 2022-03-21 NOTE — PROGRESS NOTE ADULT - NSPROGADDITIONALINFOA_GEN_ALL_CORE
I have personally seen, examined, and participated in the care of this patient. I have reviewed all pertinent clinical information, including history, physical exam, plan, and the Resident 's note and agree except as noted.  Discharge planning.

## 2022-03-21 NOTE — DISCHARGE NOTE OB - PATIENT PORTAL LINK FT
You can access the FollowMyHealth Patient Portal offered by [facility] by registering at the following website: [email or web address]. By joining [employer]’s FollowMyHealth portal, you will also be able to view your health information using other applications (apps) compatible with our system.

## 2022-03-21 NOTE — DISCHARGE NOTE OB - BRAND OF COVID-19 VACCINATION
----- Message from Angela Leal OD sent at 4/11/2019  5:03 PM CDT -----      ----- Message -----  From: Raegan Ricardo  Sent: 4/11/2019  12:07 PM  To: Angela Leal OD    Do you know anything about this? I'm unsure.     ----- Message -----  From: Elayne Euceda MA  Sent: 4/11/2019  12:01 PM  To: Elvis Weber    Hi there, did we finalize Ms. Cordova's RX for glasses?  She's ready to order lenses... thanks    
Pfizer dose 1 and 2

## 2022-03-21 NOTE — DISCHARGE NOTE OB - MEDICATION SUMMARY - MEDICATIONS TO TAKE
I will START or STAY ON the medications listed below when I get home from the hospital:    acetaminophen 325 mg oral tablet  -- 3 tab(s) by mouth every 6 hours, As Needed  -- Indication: For Pain    ibuprofen 600 mg oral tablet  -- 1 tab(s) by mouth every 6 hours  -- Indication: For Pain    benzocaine 20% topical spray  -- 1 spray(s) on skin every 6 hours, As needed, for Perineal discomfort  -- Indication: For Pain    dibucaine 1% topical ointment  -- 1 application on skin every 6 hours, As needed, Perineal discomfort  -- Indication: For Pain    lanolin topical ointment  -- 1 application on skin every 6 hours, As needed, nipple soreness  -- Indication: For Breast care    witch hazel 50% topical pad  -- 1 application on skin every 4 hours, As needed, Perineal discomfort  -- Indication: For Pain    Prenatal Multivitamins with Folic Acid 1 mg oral tablet  -- 1 tab(s) by mouth once a day  -- Indication: For 39 weeks gestation of pregnancy

## 2022-03-21 NOTE — DISCHARGE NOTE OB - CARE PROVIDER_API CALL
Dick Hansen)  Obstetrics and Gynecology  [address], [address]  [place] [address]  Phone: [phone]  Fax: [phone]  Follow Up Time:

## 2022-03-25 ENCOUNTER — EMERGENCY (EMERGENCY)
Facility: HOSPITAL | Age: 32
LOS: 1 days | Discharge: HOME | End: 2022-03-25
Attending: EMERGENCY MEDICINE
Payer: COMMERCIAL

## 2022-03-25 VITALS
OXYGEN SATURATION: 100 % | HEIGHT: 62 IN | SYSTOLIC BLOOD PRESSURE: 135 MMHG | HEART RATE: 69 BPM | RESPIRATION RATE: 18 BRPM | DIASTOLIC BLOOD PRESSURE: 81 MMHG | WEIGHT: 154.1 LBS | TEMPERATURE: 98 F

## 2022-03-25 LAB
ALBUMIN SERPL ELPH-MCNC: 3.8 G/DL — SIGNIFICANT CHANGE UP (ref 3.3–5)
ALP SERPL-CCNC: 108 U/L — SIGNIFICANT CHANGE UP (ref 40–120)
ALT FLD-CCNC: 40 U/L — SIGNIFICANT CHANGE UP (ref 10–45)
ANION GAP SERPL CALC-SCNC: 11 MMOL/L — SIGNIFICANT CHANGE UP (ref 5–17)
AST SERPL-CCNC: 20 U/L — SIGNIFICANT CHANGE UP (ref 10–40)
BASOPHILS # BLD AUTO: 0.07 K/UL — SIGNIFICANT CHANGE UP (ref 0–0.2)
BASOPHILS NFR BLD AUTO: 0.6 % — SIGNIFICANT CHANGE UP (ref 0–2)
BILIRUB SERPL-MCNC: 0.2 MG/DL — SIGNIFICANT CHANGE UP (ref 0.2–1.2)
BUN SERPL-MCNC: 9 MG/DL — SIGNIFICANT CHANGE UP (ref 7–23)
CALCIUM SERPL-MCNC: 8.9 MG/DL — SIGNIFICANT CHANGE UP (ref 8.4–10.5)
CHLORIDE SERPL-SCNC: 105 MMOL/L — SIGNIFICANT CHANGE UP (ref 96–108)
CO2 SERPL-SCNC: 24 MMOL/L — SIGNIFICANT CHANGE UP (ref 22–31)
CREAT SERPL-MCNC: 0.67 MG/DL — SIGNIFICANT CHANGE UP (ref 0.5–1.3)
EGFR: 119 ML/MIN/1.73M2 — SIGNIFICANT CHANGE UP
EOSINOPHIL # BLD AUTO: 0.3 K/UL — SIGNIFICANT CHANGE UP (ref 0–0.5)
EOSINOPHIL NFR BLD AUTO: 2.4 % — SIGNIFICANT CHANGE UP (ref 0–6)
GLUCOSE SERPL-MCNC: 89 MG/DL — SIGNIFICANT CHANGE UP (ref 70–99)
HCT VFR BLD CALC: 42.1 % — SIGNIFICANT CHANGE UP (ref 34.5–45)
HGB BLD-MCNC: 12.9 G/DL — SIGNIFICANT CHANGE UP (ref 11.5–15.5)
IMM GRANULOCYTES NFR BLD AUTO: 0.6 % — SIGNIFICANT CHANGE UP (ref 0–1.5)
LIDOCAIN IGE QN: 54 U/L — SIGNIFICANT CHANGE UP (ref 7–60)
LYMPHOCYTES # BLD AUTO: 29.1 % — SIGNIFICANT CHANGE UP (ref 13–44)
LYMPHOCYTES # BLD AUTO: 3.58 K/UL — HIGH (ref 1–3.3)
MCHC RBC-ENTMCNC: 25.9 PG — LOW (ref 27–34)
MCHC RBC-ENTMCNC: 30.6 GM/DL — LOW (ref 32–36)
MCV RBC AUTO: 84.4 FL — SIGNIFICANT CHANGE UP (ref 80–100)
MONOCYTES # BLD AUTO: 0.82 K/UL — SIGNIFICANT CHANGE UP (ref 0–0.9)
MONOCYTES NFR BLD AUTO: 6.7 % — SIGNIFICANT CHANGE UP (ref 2–14)
NEUTROPHILS # BLD AUTO: 7.47 K/UL — HIGH (ref 1.8–7.4)
NEUTROPHILS NFR BLD AUTO: 60.6 % — SIGNIFICANT CHANGE UP (ref 43–77)
NRBC # BLD: 0 /100 WBCS — SIGNIFICANT CHANGE UP (ref 0–0)
PLATELET # BLD AUTO: 526 K/UL — HIGH (ref 150–400)
POTASSIUM SERPL-MCNC: 3.8 MMOL/L — SIGNIFICANT CHANGE UP (ref 3.5–5.3)
POTASSIUM SERPL-SCNC: 3.8 MMOL/L — SIGNIFICANT CHANGE UP (ref 3.5–5.3)
PROT SERPL-MCNC: 7.2 G/DL — SIGNIFICANT CHANGE UP (ref 6–8.3)
RBC # BLD: 4.99 M/UL — SIGNIFICANT CHANGE UP (ref 3.8–5.2)
RBC # FLD: 18.6 % — HIGH (ref 10.3–14.5)
SODIUM SERPL-SCNC: 140 MMOL/L — SIGNIFICANT CHANGE UP (ref 135–145)
WBC # BLD: 12.32 K/UL — HIGH (ref 3.8–10.5)
WBC # FLD AUTO: 12.32 K/UL — HIGH (ref 3.8–10.5)

## 2022-03-25 PROCEDURE — 74177 CT ABD & PELVIS W/CONTRAST: CPT | Mod: 26,MA

## 2022-03-25 PROCEDURE — 99285 EMERGENCY DEPT VISIT HI MDM: CPT

## 2022-03-25 RX ORDER — KETOROLAC TROMETHAMINE 30 MG/ML
15 SYRINGE (ML) INJECTION ONCE
Refills: 0 | Status: DISCONTINUED | OUTPATIENT
Start: 2022-03-25 | End: 2022-03-25

## 2022-03-25 RX ORDER — ACETAMINOPHEN 500 MG
975 TABLET ORAL ONCE
Refills: 0 | Status: COMPLETED | OUTPATIENT
Start: 2022-03-25 | End: 2022-03-25

## 2022-03-25 RX ORDER — SODIUM CHLORIDE 9 MG/ML
1000 INJECTION, SOLUTION INTRAVENOUS ONCE
Refills: 0 | Status: COMPLETED | OUTPATIENT
Start: 2022-03-25 | End: 2022-03-25

## 2022-03-25 RX ORDER — ONDANSETRON 8 MG/1
4 TABLET, FILM COATED ORAL ONCE
Refills: 0 | Status: COMPLETED | OUTPATIENT
Start: 2022-03-25 | End: 2022-03-25

## 2022-03-25 RX ADMIN — ONDANSETRON 4 MILLIGRAM(S): 8 TABLET, FILM COATED ORAL at 22:11

## 2022-03-25 RX ADMIN — Medication 975 MILLIGRAM(S): at 22:12

## 2022-03-25 RX ADMIN — Medication 15 MILLIGRAM(S): at 22:23

## 2022-03-25 RX ADMIN — SODIUM CHLORIDE 1000 MILLILITER(S): 9 INJECTION, SOLUTION INTRAVENOUS at 22:11

## 2022-03-25 NOTE — ED PROVIDER NOTE - PROVIDER TOKENS
PROVIDER:[TOKEN:[[id number]],FOLLOWUP:[1-3 Days]] PROVIDER:[TOKEN:[[id number]],FOLLOWUP:[1-3 Days]]

## 2022-03-25 NOTE — CONSULT NOTE ADULT - SUBJECTIVE AND OBJECTIVE BOX
GYN Consult Note    HPI:  31y  PPD#5 s/p  (3/20) presents with abdominal pain since delivery. She was seen in her OBGYN office today who attributed the pain to musculoskeletal, she then went to urgent care and was advised to come to the ER. The patient stats that she has increased pain when she is standing for extending periods of time and leaning over the crib. She reports the pain is resolved upon lying down. She is taking about 3 Motrin per day for the pain. She had nausea with one episode of emesis yesterday. Regular bowel movements (last was yesterday morning), passing gas. Denies headaches, CP, SOB, dysuria and vaginal discharge. Minimal vaginal bleeding similar to a period.    Name of GYN Physician: Dr. Cantor  Denies fibroids, cysts, abnormal paps, STIs     PAST MEDICAL & SURGICAL HISTORY:  Thrombocytosis    No significant past surgical history    REVIEW OF SYSTEMS  General: denies fevers, chills, tiredness  Skin/Breast: denies breast pain  Respiratory and Thorax: denies shortness of breath, denies cough  Cardiovascular: denies chest pain and denies palpitations  Gastrointestinal: +abdominal pain, nausea/ vomiting	  Genitourinary: denies dysuria, increased urinary frequency, urgency	  Constitutional, Cardiovascular, Respiratory, Gastrointestinal, Genitourinary, Musculoskeletal and Integumentary review of systems are normal except as noted. 	    MEDICATIONS  PNV, Motrin approx 3x per day     Allergies  No Known Allergies    SOCIAL HISTORY:  Denies toxic habits, anxiety and depression       Vital Signs Last 24 Hrs  T(C): 36.7 (25 Mar 2022 22:15), Max: 36.7 (25 Mar 2022 18:38)  T(F): 98 (25 Mar 2022 22:15), Max: 98.1 (25 Mar 2022 18:38)  HR: 62 (25 Mar 2022 22:15) (62 - 69)  BP: 129/93 (25 Mar 2022 22:15) (129/93 - 135/81)  BP(mean): --  RR: 18 (25 Mar 2022 22:15) (18 - 18)  SpO2: 100% (25 Mar 2022 22:15) (100% - 100%)    PHYSICAL EXAM:   Gen: NAD, alert and oriented x 3  Cardiovascular: RRR  Respiratory: CTABL, breathing comfortably on RA  Abd: soft, mild diffuse tenderness, non-distended, no rebound/guarding  Pelvic: closed/long, no CMT, Uterus: fundus firm, non tender  Adnexa: non tender, no palpable masses  Extremities: NTBL  Skin: warm and well perfused      LABS:                        12.9   12.32 )-----------( 526      ( 25 Mar 2022 22:26 )             42.1         140  |  105  |  9   ----------------------------<  89  3.8   |  24  |  0.67    Ca    8.9      25 Mar 2022 22:26    TPro  7.2  /  Alb  3.8  /  TBili  0.2  /  DBili  x   /  AST  20  /  ALT  40  /  AlkPhos  108            RADIOLOGY & ADDITIONAL STUDIES: GYN Consult Note    HPI:  31y  PPD#5 s/p  (3/20) presents with abdominal pain since delivery. She was seen in her OBGYN office today who attributed the pain to musculoskeletal, she then went to urgent care and was advised to come to the ER. The patient stats that she has increased pain when she is standing for extending periods of time and leaning over the crib. She reports the pain is resolved upon lying down. She is taking about 3 Motrin per day for the pain. She had nausea with one episode of emesis yesterday. Regular bowel movements (last was yesterday morning), passing gas. Denies headaches, CP, SOB, dysuria and vaginal discharge. Minimal vaginal bleeding similar to a period.    Name of GYN Physician: Dr. Cantor  Denies fibroids, cysts, abnormal paps, STIs     PAST MEDICAL & SURGICAL HISTORY:  Thrombocytosis    No significant past surgical history    REVIEW OF SYSTEMS  General: denies fevers, chills, tiredness  Skin/Breast: denies breast pain  Respiratory and Thorax: denies shortness of breath, denies cough  Cardiovascular: denies chest pain and denies palpitations  Gastrointestinal: +abdominal pain, nausea/ vomiting	  Genitourinary: denies dysuria, increased urinary frequency, urgency	  Constitutional, Cardiovascular, Respiratory, Gastrointestinal, Genitourinary, Musculoskeletal and Integumentary review of systems are normal except as noted. 	    MEDICATIONS  PNV, Motrin approx 3x per day     Allergies  No Known Allergies    SOCIAL HISTORY:  Denies toxic habits, anxiety and depression       Vital Signs Last 24 Hrs  T(C): 36.7 (25 Mar 2022 22:15), Max: 36.7 (25 Mar 2022 18:38)  T(F): 98 (25 Mar 2022 22:15), Max: 98.1 (25 Mar 2022 18:38)  HR: 62 (25 Mar 2022 22:15) (62 - 69)  BP: 129/93 (25 Mar 2022 22:15) (129/93 - 135/81)  BP(mean): --  RR: 18 (25 Mar 2022 22:15) (18 - 18)  SpO2: 100% (25 Mar 2022 22:15) (100% - 100%)    PHYSICAL EXAM:   Gen: NAD, alert and oriented x 3  Cardiovascular: RRR  Respiratory: CTABL, breathing comfortably on RA  Abd: soft, mild diffuse tenderness, non-distended, no rebound/guarding  Pelvic: closed/long, no CMT, Uterus: fundus firm, non tender  Adnexa: non tender, no palpable masses  Extremities: NTBL  Skin: warm and well perfused      LABS:                        12.9   12.32 )-----------( 526      ( 25 Mar 2022 22:26 )             42.1         140  |  105  |  9   ----------------------------<  89  3.8   |  24  |  0.67    Ca    8.9      25 Mar 2022 22:26    TPro  7.2  /  Alb  3.8  /  TBili  0.2  /  DBili  x   /  AST  20  /  ALT  40  /  AlkPhos  108            RADIOLOGY & ADDITIONAL STUDIES:    < from: CT Abdomen and Pelvis w/ IV Cont (22 @ 22:49) >  IMPRESSION:    Postpartum appearance of the uterus.    Mild bilateral hydronephrosis and bladder distention. Correlate for   symptoms of urinary retention.        < end of copied text >

## 2022-03-25 NOTE — ED PROVIDER NOTE - CLINICAL SUMMARY MEDICAL DECISION MAKING FREE TEXT BOX
David Ayers (MD): 31y F  presenting w/ abd pain 4 days s/p vag delivery. On exam pt w/ tenderness to R side of abdomen. Will get CT scan of abdomen and pelvis. Will check CBC, CMP, lipase, UA, and consult OB/GYN.

## 2022-03-25 NOTE — ED PROVIDER NOTE - NSFOLLOWUPINSTRUCTIONS_ED_ALL_ED_FT
Follow up with neurology, Dr. Brian, in 2-3 days.    Return to the ED for fever, weakness, trouble with your speech or other emergent concerns. Follow up with Dr. Mcnair in 2-3 days.    Return to the ED for fever, weakness, heavy vaginal bleeding or worsening abdominal pain. Follow up with Dr. Mcnair in 2-3 days.    Return to the ED for fever, weakness, heavy vaginal bleeding or worsening abdominal pain.    Take ibuprofen 600mg every 6 hours as needed for pain.  Take acetaminophen 1000mg every 6 hours as needed for pain.  You can take these meds at the same time.

## 2022-03-25 NOTE — ED PROVIDER NOTE - NS_ ATTENDINGSCRIBEDETAILS _ED_A_ED_FT
The scribe's documentation has been prepared under my direction and personally reviewed by me in its entirety. I confirm that the note above accurately reflects all work, treatment, procedures, and medical decision making performed by me (Dr. Ayers).

## 2022-03-25 NOTE — ED PROVIDER NOTE - NS ED ATTENDING STATEMENT MOD
This was a shared visit with the JIM. I reviewed and verified the documentation and independently performed the documented:

## 2022-03-25 NOTE — ED PROVIDER NOTE - CARE PROVIDER_API CALL
Rashid Brian)  Neurology; Vascular Neurology  [address] [facility] [address], [address]  [place] [address]  Phone: [phone]  Fax: [phone]  Follow Up Time: 1-3 Days   Kim Mcnair)  Obstetrics and Gynecology  [address] [place] [address]  Phone: [phone]  Fax: [phone]  Follow Up Time: 1-3 Days

## 2022-03-25 NOTE — ED PROVIDER NOTE - PROGRESS NOTE DETAILS
Pelvic exam:  No external lesions noted. Labia normal appearing.  No adnexal tenderness bilaterally. No CMT. Minimal brown bloody discharge.  Bijal Hernández PA-C Andria:  seen by neurology.  pt to follow up with dr. brown as outpatient. Acerra:  seen by OB.  CT results reviewed.  pt with no urinary symptoms.  had not urinated prior to CT.  pt tolerating po and feeliing better.  will discharge with outpatient f/u

## 2022-03-25 NOTE — ED PROVIDER NOTE - OBJECTIVE STATEMENT
31y F  w/ no pertinent PMHx presents to the ED c/o worsening abd pain x5days a/w N/V yesterday. Endorses mild bleeding, subjective fever. Pain is worsened w/ movement. Pt states that she delivered vaginally on , was dc'ed on Monday. Originally thought pain was normal due to delivery. Went to her OB this morning and was told it was a muscle strain. Pt was not convinced so went to UC and was told to go the ED. Took Motrin for pain. Denies Hx of abd surgery. NKDA.  OB/GYN: Dr. Kim FERRER Oh T:[phone] 31y F  w/ no pertinent PMHx presents to the ED c/o worsening abd pain x5days a/w N/V yesterday. Endorses mild bleeding, subjective fever. Pain is worsened w/ movement. Pt states that she delivered vaginally on , was dc'ed on Monday. Originally thought pain was normal due to delivery. Went to her OB this morning and was told it was a muscle strain. Pt was not convinced so went to UC and was told to go the ED. Took Motrin for pain. Denies Hx of abd surgery. NKDA.  OB/GYN: Dr. Kim FERRER Oh T:[phone]

## 2022-03-25 NOTE — CONSULT NOTE ADULT - ASSESSMENT
31y  PPD#5 s/p  (3/20) presents with abdominal pain since delivery. Upon arrival VS wnl, WBC 12.32, Hct 42.1 and PE with minimal diffuse tenderness relieved with Toradol given in the ED.     - f/u CT   - Tylenol 975mg and Motrin 600mg every 6 hours   - bowel regimen: senna, miralax, simethicone   - warm packs, abdominal binder and positional modifications   - f/u with OBGYN within the week   - nothing in the vagina, no strenuous activity/heavy lifting     to be d/w Dr. Jesica Coelho PGY2   31y  PPD#5 s/p  (3/20) presents with abdominal pain since delivery. Upon arrival VS wnl, WBC 12.32, Hct 42.1 and PE with minimal diffuse tenderness relieved with Toradol given in the ED. CT with postpartum appearance of the uterus, b/l hydronephrosis and bladder distention.     - f/u UA, encourage regular voiding  - Tylenol 975mg and Motrin 600mg every 6 hours   - bowel regimen: senna, miralax, simethicone   - warm packs, abdominal binder and positional modifications   - f/u with OBGYN within the week   - nothing in the vagina, no strenuous activity/heavy lifting     to be d/w Dr. Jesica Coelho PGY2   31y  PPD#5 s/p  (3/20) presents with abdominal pain since delivery. Upon arrival VS wnl, WBC 12.32, Hct 42.1 and PE with minimal diffuse tenderness relieved with Toradol given in the ED. CT with postpartum appearance of the uterus, b/l hydronephrosis and bladder distention. Differential includes musculoskeletal pain, pyelonephritis, UTI, etc.     - f/u UA  - Tylenol 975mg and Motrin 600mg every 6 hours   - bowel regimen: senna, miralax, simethicone   - warm packs, abdominal binder and positional modifications   - f/u with OBGYN within the week   - nothing in the vagina, no strenuous activity/heavy lifting   - primary care per ED    to be d/w Dr. Jesica Coelho PGY2

## 2022-03-25 NOTE — ED PROVIDER NOTE - PATIENT PORTAL LINK FT
You can access the FollowMyHealth Patient Portal offered by [facility] by registering at the following website: [email or web address]. By joining [employer]’s FollowMyHealth portal, you will also be able to view your health information using other applications (apps) compatible with our system.

## 2022-03-26 VITALS
TEMPERATURE: 98 F | RESPIRATION RATE: 17 BRPM | SYSTOLIC BLOOD PRESSURE: 124 MMHG | HEART RATE: 57 BPM | DIASTOLIC BLOOD PRESSURE: 77 MMHG | OXYGEN SATURATION: 99 %

## 2022-03-26 LAB
APPEARANCE UR: CLEAR — SIGNIFICANT CHANGE UP
BILIRUB UR-MCNC: NEGATIVE — SIGNIFICANT CHANGE UP
COLOR SPEC: COLORLESS — SIGNIFICANT CHANGE UP
DIFF PNL FLD: ABNORMAL
GLUCOSE UR QL: NEGATIVE — SIGNIFICANT CHANGE UP
KETONES UR-MCNC: NEGATIVE — SIGNIFICANT CHANGE UP
LEUKOCYTE ESTERASE UR-ACNC: ABNORMAL
NITRITE UR-MCNC: NEGATIVE — SIGNIFICANT CHANGE UP
PH UR: 7 — SIGNIFICANT CHANGE UP (ref 5–8)
PROT UR-MCNC: NEGATIVE — SIGNIFICANT CHANGE UP
SP GR SPEC: 1.02 — SIGNIFICANT CHANGE UP (ref 1.01–1.02)
UROBILINOGEN FLD QL: NEGATIVE — SIGNIFICANT CHANGE UP

## 2022-03-26 PROCEDURE — 36415 COLL VENOUS BLD VENIPUNCTURE: CPT

## 2022-03-26 PROCEDURE — 81001 URINALYSIS AUTO W/SCOPE: CPT

## 2022-03-26 PROCEDURE — 96375 TX/PRO/DX INJ NEW DRUG ADDON: CPT

## 2022-03-26 PROCEDURE — 96374 THER/PROPH/DIAG INJ IV PUSH: CPT | Mod: XU

## 2022-03-26 PROCEDURE — 85025 COMPLETE CBC W/AUTO DIFF WBC: CPT

## 2022-03-26 PROCEDURE — 80053 COMPREHEN METABOLIC PANEL: CPT

## 2022-03-26 PROCEDURE — 83690 ASSAY OF LIPASE: CPT

## 2022-03-26 PROCEDURE — 74177 CT ABD & PELVIS W/CONTRAST: CPT | Mod: MA

## 2022-03-26 PROCEDURE — 99284 EMERGENCY DEPT VISIT MOD MDM: CPT | Mod: 25

## 2022-03-26 RX ORDER — FAMOTIDINE 10 MG/ML
20 INJECTION INTRAVENOUS ONCE
Refills: 0 | Status: COMPLETED | OUTPATIENT
Start: 2022-03-26 | End: 2022-03-26

## 2022-03-26 RX ADMIN — FAMOTIDINE 20 MILLIGRAM(S): 10 INJECTION INTRAVENOUS at 00:26

## 2022-03-26 RX ADMIN — Medication 30 MILLILITER(S): at 00:26

## 2022-03-26 NOTE — ED ADULT NURSE NOTE - OBJECTIVE STATEMENT
pt is 4 days postpartum s/p vaginal delivery; no complications; comes in with sharp abd pain x 2 dyas; no nausea or vomit; pt is alert in no acute distress; skin warm and dry

## 2022-06-30 NOTE — OB RN PATIENT PROFILE - FUNCTIONAL ASSESSMENT - DAILY ACTIVITY SCORE.
Dr. Rodriguez at  to discuss findings. VSS. No  Pain, no GI bleeding. Pt to be discharged from unit.   24

## 2023-01-23 NOTE — ED PROVIDER NOTE - NSCAREINITIATED _GEN_ER
Cardiology Clinic Note: Huong Garcia PA-C     Primary care physician: Luanne Roberts MD     Date of Service: 1/23/2023       HPI  Dudley Flores is a 63 year old male with past medical history significant for benign essential hypertension, hyperlipidemia, type 2 diabetes mellitys, pulmonary embolism (2016, bilateral DVTs and PE's with core pulmonale), pulmonary fibrosis, MUKESH, and spinal stenosis who is here for blood pressure management.     At prior visit with Dr. Vaughn, patient's Losartan was increase to 100mg daily. At visit with PCP, blood pressure was 110/60. PCP was concerned for orthostatic hypotension and high risk for falling so Losartan was decreased to 50mg daily.      Today, patient states that he did not keep blood pressure log. He would like to start today. Reports he has been taking Losartan 100mg daily as instructed by Dr. Doran. Reports wearing compression stockings for lower extremity swelling. Denies dizziness or light headedness while changing from a seat to standing or lying to standing position. Denies chest pain, shortness of breath, cough, palpitations, lower extremity edema, light headedness, or syncope.     Review of Systems   Constitutional: Negative for fatigue and unexpected weight change.   HENT: Negative for nosebleeds.    Respiratory: Negative for cough and shortness of breath.    Cardiovascular: Negative for chest pain, palpitations and leg swelling.   Gastrointestinal: Negative for abdominal distention, abdominal pain and blood in stool.   Endocrine: Negative for polyuria.   Genitourinary: Negative for hematuria.   Musculoskeletal: Negative for myalgias.   Skin: Negative for pallor.   Neurological: Negative for syncope and light-headedness.   Hematological: Does not bruise/bleed easily.   Otherwise complete ROS is negative.      Past Medical History:   Diagnosis Date   • Arthritis    • Chronic pulmonary embolism without acute cor pulmonale (CMS/HCC) 5/7/2019   • Chronic  venous insufficiency    • Clotting disorder (CMS/HCC)    • Diabetes mellitus (CMS/HCC)    • Essential (primary) hypertension    • HLD (hyperlipidemia)    • Lupus anticoagulant positive    • Obesity    • Pulmonary embolism (CMS/HCC)    • Spinal stenosis     w/ peripheral neuropathy   • Venous stasis ulcers of both lower extremities (CMS/HCC) 2/9/2017        Past Surgical History:   Procedure Laterality Date   • Arthrotomy,open repair meniscus Left    • Carpal tunnel release Bilateral    • Hernia repair Left     Left inguinal hernia repair as a child (7 year-old)   • Spinal cord stimulator implant  11/2018    [facility] () per Dr Parikh   • Tonsillectomy          Family History   Problem Relation Age of Onset   • Cancer, Colon Mother    • Stroke Mother 65   • Myocardial Infarction Father 67   • Cancer, Lung Father    • Postmenopausal breast cancer Sister    • Diabetes Paternal Grandmother    • Dementia/Alzheimers Neg Hx    • Parkinsonism Neg Hx         Social History     Tobacco Use   • Smoking status: Never   • Smokeless tobacco: Never   Vaping Use   • Vaping Use: never used   Substance Use Topics   • Alcohol use: Not Currently   • Drug use: No        ALLERGIES:  No Known Allergies  Current Outpatient Medications   Medication Sig   • omeprazole (PriLOSEC) 40 MG capsule TAKE 1 CAPSULE BY MOUTH DAILY   • losartan (COZAAR) 100 MG tablet Take 1 tablet by mouth daily.   • DULoxetine (CYMBALTA) 30 MG capsule Take 1 capsule by mouth daily. 10/27/22 takes 30mg at night   • DULoxetine (CYMBALTA) 60 MG capsule Take 1 capsule by mouth daily. 10/27/22 taking 60mg daily (daytime)   • atorvastatin (LIPITOR) 40 MG tablet TAKE 1 TABLET BY MOUTH DAILY   • semaglutide,0.25 or 0.5 mg/DOSE, (OZEMPIC) (1.34 mg/ml) 0.25 or 0.5 MG/DOSE injection Inject 0.25 mg into the skin every 7 days.   • polyethylene glycol (MIRALAX) 17 GM/SCOOP powder Take 17 g by mouth daily. Stir and dissolve powder in any 4 to 8 ounces of beverage, then  drink.   • carisoprodol (SOMA) 350 MG tablet carisoprodol 350 mg tablet   TAKE 1 TABLET BY MOUTH TWICE DAILY   • carvedilol (COREG) 12.5 MG tablet TAKE 1 TABLET BY MOUTH TWICE DAILY WITH MEALS   • albuterol 108 (90 Base) MCG/ACT inhaler INHALE 2 PUFFS INTO THE LUNGS EVERY 4 HOURS AS NEEDED FOR SHORTNESS OF BREATH OR WHEEZING   • Eliquis 5 MG Tab TAKE 1 TABLET BY MOUTH TWICE DAILY   • furosemide (LASIX) 40 MG tablet Take 1 tablet by mouth 2 times daily.   • metFORMIN (GLUCOPHAGE) 500 MG tablet Take 1 tablet by mouth 2 times daily (with meals).   • amitriptyline (ELAVIL) 50 MG tablet TAKE 1 AND 1/2 TABLETS BY MOUTH EVERY NIGHT AT BEDTIME (Patient taking differently: 50 mg. 10/27/22  taking 1 tablet at bedtime)   • empagliflozin (Jardiance) 25 MG tablet Take 1 tablet by mouth daily (before breakfast).   • diclofenac (VOLTAREN) 1 % gel Apply 2 g topically 3 times daily.   • Cyanocobalamin (VITAMIN B-12 PO) Take by mouth daily.   • fluticasone-salmeterol (AIRDUO RESPICLICK) 232-14 MCG/ACT inhaler Inhale 1 puff into the lungs 2 times daily.   • aspirin (ASPIRIN 81) 81 MG EC tablet Take 1 tablet by mouth daily.   • Blood Glucose Calibration (ACCU-CHEK NIYAH) Solution USE AS DIRECTED.   • Blood Glucose Monitoring Suppl (ACCU-CHEK NIYAH PLUS) w/Device Kit USE AS DIRECTED.   • blood glucose (ACCU-CHEK NIYAH PLUS) test strip TEST TWO TIMES DAILY   • Alcohol Swabs (ALCOHOL PADS) 70 % Pads USE TWO TIMES DAILY   • Lancet Devices (EASY MINI EJECT LANCING DEVICE) Misc USE AS DIRECTED.   • Easy Comfort Lancets Misc TEST TWO TIMES DAILY   • Thiamine HCl (VITAMIN B-1) 100 MG tablet Take 100 mg by mouth.   • Magnesium Oxide 400 (241.3 Mg) MG Tab TAKE 1 TABLET BY MOUTH TWICE DAILY     No current facility-administered medications for this visit.        Objective     Physical Exam   Visit Vitals  /59 (BP Location: LUE - Left upper extremity, Patient Position: Sitting, Cuff Size: Regular)   Pulse 76   Wt 133.9 kg (295 lb 3.1 oz)    SpO2 94%   BMI 38.95 kg/m²     Wt Readings from Last 4 Encounters:   01/23/23 133.9 kg (295 lb 3.1 oz)   01/17/23 132.9 kg (293 lb)   01/09/23 129.9 kg (286 lb 7 oz)   01/03/23 130.7 kg (288 lb 4 oz)     General: NAD  Eyes:  No arcus; No xanthelasma;  No ptosis; pupils equal in size; no icterus  Neck: No JVD sitting; carotid normal amplitude; normal cervical ROM  Respiratory: Symmetric chest expansion, No crackles, wheezes or dullness  Cardiovascular:  Rhythm regular; Normal S1, S2 split normal; No gallops, rubs or murmurs; No carotid bruits; DP pulses 2+ bilaterally  GI: Not distended, non-tender, BS active; no flank dullness  MSK: Muscle mass as expected for age   Extremities: Trace edema on right lower extremity; no varicose veins  Neurologic:  Alert, no tremor  Mood: Euthymic  Skin: Intact     Labs  Cholesterol (mg/dL)   Date Value   01/18/2023 119     HDL (mg/dL)   Date Value   01/18/2023 56     Cholesterol/ HDL Ratio (no units)   Date Value   01/18/2023 2.1     Triglycerides (mg/dL)   Date Value   01/18/2023 98     LDL (mg/dL)   Date Value   01/18/2023 43        Hemoglobin A1C (%)   Date Value   01/18/2023 7.2 (H)        WBC (K/mcL)   Date Value   11/09/2022 9.2     RBC (mil/mcL)   Date Value   11/09/2022 4.62     HCT (%)   Date Value   11/09/2022 43.1     HGB (g/dL)   Date Value   11/09/2022 14.1     PLT (K/mcL)   Date Value   11/09/2022 227        Sodium (mmol/L)   Date Value   01/18/2023 137     Potassium (mmol/L)   Date Value   01/18/2023 4.6     Chloride (mmol/L)   Date Value   01/18/2023 104     Glucose (mg/dL)   Date Value   01/18/2023 131 (H)     Calcium (mg/dL)   Date Value   01/18/2023 9.2     Carbon Dioxide (mmol/L)   Date Value   01/18/2023 25     BUN (mg/dL)   Date Value   01/18/2023 19     Creatinine (mg/dL)   Date Value   01/18/2023 0.72        GOT/AST (Units/L)   Date Value   01/18/2023 35     GPT/ALT (Units/L)   Date Value   01/18/2023 36     No results found for: GGTP  Alkaline Phosphatase  (Units/L)   Date Value   01/18/2023 69     Bilirubin, Total (mg/dL)   Date Value   01/18/2023 0.5           Cardiac Imaging   CT PE 9/10/2016  IMPRESSION:   - Large bilateral pulmonary emboli with evidence of right heart strain.  - Exophytic right renal cysts, incompletely visualized.     Venous Duplex 9/11/2016  Impression:   No evidence of acute deep vein thrombosis in the right lower   extremity from the level the calf veins to the groin.    Chronic deep vein thrombosis involving the distal left femoral, left   popliteal, and left peroneal veins. No acute DVT in the left lower   extremity.    Left Baker's cyst.     Echocardiogram  9/11/16:  CONCLUSION:       1. Severe right ventricular enlargement with associated akinesis of   the mid to apical segment of the free wall (TAPSE 12mm)  2. Mild reduction in left ventricular systolic function.  Regional wall motion abnormalities present --  D shaped septum in   systole and diastole suggestive of pressure and volume overload  3. Mild left ventricular hypertrophy  4. Ejection fraction 50%  5. PA systolic pressure estimated at 70 mmHg, severe pulmonary   hypertension     Echocardiogram 9/16/2019:  CONCLUSION:    1. Normal RV systolic function at the base; the mid to apical RV is   hypokinetic but significantly improved since last echocardiogram   dated 9/11/2016  2. Normal left ventricular size and systolic function  3. Mild left ventricular hypertrophy   4. Ejection fraction 55%  5. PA systolic pressure estimated at 40 mmHg    Since last echocardiogram dated 9/11/2016, the RV function has   improved and the PA pressures have decreased                                                 Echocardiogram 10/28/16:  CONCLUSION:     1. Mild right ventricular enlargement. The mid to apical RV systolic   function has improved since last echocardiogram dated 9/16/2016  2. PA systolic pressure estimated at 30 mmHg  3. Normal left ventricular size and systolic function  4. Ejection  fraction 55%     Venous Duplex 01/31/17:   Conclusions    RIGHT: There is no evidence of acute deep venous thrombosis in the   right lower extremity.     LEFT: There is no evidence of acute deep venous thrombosis of the   left lower extremity.     Limited study in the calf due to edema and vessel depth. Unable to   visualize the soleal     vein.       Dobutamine Stress Echocardiogram 1/10/2018:  CONCLUSION: Negative dobutamine stress echo for inducible ischemia at   a lower sensitivity/specificity due to target heart rate not reached.   There was augmentation of the left ventricular systolic function   during the dobutamine infusion and no wall motion abnormalities.   There also no EKG changes during the dobutamine infusion other than   isolated PVC.     CT PE 6/12/18:  IMPRESSION:  No pulmonary emboli.    Diffuse bilateral groundglass and reticular opacities peripherally   located, bilateral upper lobe predominance. These findings are new   compared to prior examination. Findings are nonspecific but could   represent interstitial lung disease such as NSIP. Recommend pulmonary   consultation on nonemergent basis.     Echocardiogram 06/14/2018:  CONCLUSION    Poor echo images    Mild left ventricular hypertrophy.     Normal global left ventricular ejection fraction.     Left ventricular diastolic dysfunction, stage 1.     Mild tricuspid regurgitation.      Regadenoson Stress Test 4/16/2020:  Stress ECG analysis  -           The patient had no symptoms during the test. The patient complained of fatigue which resolved during recovery  -           The BP response was normal.      -           Baseline EKG shows sinus rhythm with first degree AVB and non-specific ST changes.  -           Stress EKG shows is non-diagnostic due to baseline abnormalities.     Myocardial Perfusion Analysis   -           The right ventricle is normal at rest and with stress.  -           The left ventricle is normal size at rest and has normal  wall thickness and remained the same with stress. Calculated transient ischemic dilation is 1.09.  -           Left ventricular ejection fraction is 68% at rest and 55% at peak stress.  -           No pulmonary uptake of radiotracer is seen.  -           Scintigraphic images reveal a medium size, mild perfusion defect in the mid to basal inferior segments with predominant reversibility that has the appearance of diaphragmatic attenuation artifact.   -           Wall motion abnormalities are not seen.      CONCLUSION  Perfusion Study with suspected diaphragmatic attenuation artifact, but cannot rule out moderate amount of ischemia in the right coronary artery territory.  No wall motion abnormalities are seen.  Non-diagnostic stress EKG.  LVEF 68%. Clinical correlation is advised.  Dr. Jayce Doran notified of findings.     Coronary CTA 6/2/2020:  FINDINGS:     CORONARY ARTERY CALCIUM SCORE (AGATSTON): 10. Percentile: 37th. There is calcium noted in the LM   (2), RCA (6), LAD (1), LCX (1)     Interpretation of calcium scores percentiles: 0% represents the lowest calcium score, and 100%   represents highest calcium score. Percentiles based on the Multi-ethnic study of Atherosclerosis   (GALLEGO) study, adjusted for age, gender and ethnicity. (http://gallego-nhlbi.org/Calcium/input.aspx)     CORONARY ARTERIES:     RIGHT CORONARY ARTERY (RCA): The RCA is a medium size, dominant artery with normal origin. The   distal RCA has noncalcified plaque with 25-50% atherosclerosis. The RPDA reaches close to apex.   The rest of RCA and its branches have minimal disease.     LEFT MAIN (LM): The LM has normal origin and minimal atherosclerosis.     LEFT ANTERIOR DESCENDING ARTERY (LAD): The LAD is a medium size artery that reaches the apex. The   LAD has one diagonal branch. Mild atherosclerosis.     LEFT CIRCUMFLEX (LCX): Medium size vessel. Mild atherosclerosis.     Coronary dominance: Right     Left ventricle: LVEDV is 161 ml, LVESV  is 56 ml, LVEF is 65%     Right ventricle: Normal size     Left atrium: Normal size. Lipomatous septal hypertrophy is noted.     Right atrium: Normal size     Aorta: The following measurements were obtained: Ascending aorta 36 mm, Descending aorta 25 mm,   both at the level of PA bifurcation.     Valves: Normal thickness     Pulmonary veins: There are 4 pulmonary veins draining into the left atrium, 2 on each side     Pulmonary arteries: The study was not designed to evaluate the pulmonary arteries     Pericardium: Normal thickness. No effusion     TTE 6/10/2022:  STUDY CONCLUSIONS  SUMMARY:  1. Left ventricle: The cavity size is normal. Wall thickness is normal.     The ejection fraction was measured by 3D assessment. The ejection     fraction is 53%.  2. Right ventricle: The cavity size is normal. Wall thickness is normal.     Systolic function is normal.  3. Inferior vena cava: The vessel is normal in size.  4. Compared to prior study from 1/14/2020, there is no significant change.     Right heart cath 6/30/2022:  Findings:  Hemodynamics:  1. Systemic blood pressure and heart rate: 125/65 mmHg, 59 bpm  2. Right atrial pressures:     6/4/0 mmHg  3. Right ventricular pressures:    33/0/8 mmHg  4. Pulmonary artery pressures:   27/3/15 mmHg  5. Pulmonary capillary wedge pressures: 13/10/7 mmHg     Plan and Recommendations:  Routine post cath care.  Not fluid overloaded.  Continue current management.    Assessment     1. Benign Essential hypertension   At goal.   BMP did not show hyperkalemia or ARACELY   Continue Carvedilol 12.5mg daily and Losartan 100mg daily      2. Hyperlipidemia   01/18/2023 , TG 98, HDL 56, LDL 43   LDL goal < 70   At goal.   Continue Atorvastatin 40mg daily     3. Lower Extremity Venous Statis   + trace lower extremity edema in right leg  Continue wearing compression stockings   Elevated lower extremities daily     Recommendations  1. Keep blood pressure log x 2 weeks - medical staff to  follow up for readings   2. Continue cardiac medications   3. Follow up as scheduled with Dr. Doran       The total amount of time spent in the care of this patient ( this includes. but is not limited to,face-to-face interaction, imaging and chart review, discussion with other physicians and member of the health care team and coordination of care) is 30 minutes. Spent greater than 50% of time with counseling and education. Counseling regarding importance of medication compliance, blood pressure goal, LDL goal , as well as review of past medical records and cardiovascular work.      Huong Garcia PA-C   Interventional Cardiology      Rizwana Mcallister(PA)

## 2023-10-21 NOTE — OB RN PATIENT PROFILE - NS_PRENTALCLASSES_OBGYN_ALL_OB
Chief Complaint   Patient presents with    Follow-Up     MS       Problem List Items Addressed This Visit       Physiological tremor     Patient was evaluated by Dr. Eckert and felt to have physiologic tremor.  She does respond to a small dose of EtOH but that only last several hours.  He did discuss a trial of primidone in his note and we have discussed that today as an option which she is wanting to try.         Relevant Medications    primidone (MYSOLINE) 50 MG Tab    Paresthesias     Patient continues to have paresthesias in the soles of her feet primarily when standing they can be painful and I have buzzing sensation at times sometimes she gets that in her upper legs 2.  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         Abnormal brain MRI     Miguel Ángel is a 40-year-old woman who had an MRI in April of this year which showed several white matter abnormalities in the right frontal lobe when she was having neurologic symptoms both cognitive and physical paresthesias and weakness.  In addition she has been having issues with tremor.  Believes symptoms have been waxing and waning but ongoing for several years.  She does feel about of COVID made them worse.          Other Visit Diagnoses       Tremor        Relevant Medications    primidone (MYSOLINE) 50 MG Tab    Other Relevant Orders    MR-BRAIN-WITH & W/O    Abnormal MRI of head        Relevant Orders    MR-BRAIN-WITH & W/O            History of present illness:  Ozzy Do 40 y.o. female presents today for history of abnormal brain MRI with symptoms of tremor, lower extremity paresthesias and an evaluation by movement disorder colleagues since the last visit.    Past medical history:   Past Medical History:   Diagnosis Date    Asthma     Awareness under anesthesia     PONV (postoperative nausea and vomiting)     Psychiatric problem     Depression  "      Past surgical history:   Past Surgical History:   Procedure Laterality Date    PB KNEE SCOPE,DIAGNOSTIC Left 2021    Procedure: ARTHROSCOPY, KNEE;  Surgeon: Rick Christensen M.D.;  Location: SURGERY [facility];  Service: Orthopedics    PB KNEE SCOPE,MED/LAT MENISECTOMY Left 2021    Procedure: MENISCECTOMY, KNEE, MEDIAL;  Surgeon: Rick Christensen M.D.;  Location: SURGERY [facility];  Service: Orthopedics    OTHER ORTHOPEDIC SURGERY Right 1997    Shoulder    OTHER      Tooth extraction X1       Family history:   Family History   Problem Relation Age of Onset    Cancer Paternal Grandmother     Heart Disease Father     Heart Failure Father        Social history:   Social History     Socioeconomic History    Marital status:      Spouse name: Not on file    Number of children: Not on file    Years of education: Not on file    Highest education level: Not on file   Occupational History    Not on file   Tobacco Use    Smoking status: Former     Current packs/day: 0.00     Types: Cigarettes     Start date:      Quit date:      Years since quittin.8    Smokeless tobacco: Never    Tobacco comments:     \"On and off\"   Vaping Use    Vaping Use: Former    Substances: Nicotine   Substance and Sexual Activity    Alcohol use: Yes     Comment: occasional     Drug use: Yes     Types: Oral     Comment: THC    Sexual activity: Yes     Partners: Male     Birth control/protection: Pill   Other Topics Concern    Not on file   Social History Narrative    Not on file     Social Determinants of Health     Financial Resource Strain: Not on file   Food Insecurity: Not on file   Transportation Needs: Not on file   Physical Activity: Not on file   Stress: Not on file   Social Connections: Not on file   Intimate Partner Violence: Not on file   Housing Stability: Not on file       Current medications:   Current Outpatient Medications   Medication    acyclovir (ZOVIRAX) 5 % Ointment    primidone (MYSOLINE) 50 MG " "Tab    Bacillus Coagulans-Inulin (PROBIOTIC) 1-250 BILLION-MG Cap    cetirizine (ZYRTEC) 10 MG Tab    fluoxetine (PROZAC) 40 MG capsule    MAXWELL 1/20 1-20 MG-MCG per tablet     No current facility-administered medications for this visit.       Medication Allergy:  Allergies   Allergen Reactions    Tape Rash     \"Burns and skin peels off with bandaids and regular tape\" Does not know about paper tape.    Ceclor [Cefaclor]        Review of systems:   Constitutional: denies fever, night sweats, weight loss.   Eyes: denies acute vision change, eye pain or secretion.   Ears, Nose, Mouth, Throat: denies nasal secretion, nasal bleeding, difficulty swallowing, hearing loss, tinnitus, vertigo, ear pain, acute dental problems, oral ulcers or lesions.   Endocrine: denies recent weight changes, heat or cold intolerance, polyuria, polydypsia, polyphagia,abnormal hair growth.  Cardiovascular: denies new onset of chest pain, palpitations, syncope, or dyspnea of exertion.  Pulmonary: denies shortness of breath, new onset of cough, hemoptysis, wheezing, chest pain or flu-like symptoms.   GI: denies nausea, vomiting, diarrhea, GI bleeding, change in appetite, abdominal pain, and change in bowel habits.  : denies dysuria, urinary incontinence, hematuria.  Heme/oncology: denies history of easy bruising or bleeding. No history of cancer, DVTor PE.  Allergy/immunology: denies hives/urticaria, or itching.   Dermatologic: denies new rash, or new skin lesions.  Musculoskeletal:denies joint swelling or pain, muscle pain, neck and back pain. Neurologic: denies headaches, acute visual changes, facial droopiness, muscle weakness (focal or generalized), paresthesias, anesthesia, ataxia, change in speech or language, memory loss, abnormal movements, seizures, loss of consciousness, or episodes of confusion.   Psychiatric: denies symptoms of depression, anxiety, hallucinations, mood swings or changes, suicidal or homicidal thoughts.     Physical " "examination:   Vitals:    10/20/23 0835   BP: 106/66   BP Location: Right arm   Patient Position: Sitting   BP Cuff Size: Adult   Pulse: 85   Temp: 36.4 °C (97.5 °F)   TempSrc: Temporal   SpO2: 97%   Weight: 73.8 kg (162 lb 11.2 oz)   Height: 1.6 m (5' 3\")     General: Patient in no acute distress, pleasant and cooperative.  HEENT: Normocephalic, no signs of acute trauma.   Neck: supple, no meningeal signs or carotid bruits. There is normal range of motion. No tenderness on exam.   Chest: clear to auscultation. No cough.   CV: RRR, no murmurs.   Skin: no signs of acute rashes or trauma.   Musculoskeletal: joints exhibit full range of motion, without any pain to palpation. There are no signs of joint or muscle swelling. There is no tenderness to deep palpation of muscles.   Psychiatric: No hallucinatory behavior. Denies symptoms of depression or suicidal ideation. Mood and affect appear normal on exam.     NEUROLOGICAL EXAM:   Mental status, orientation: Awake, alert and fully oriented.   Speech and language: speech is clear and fluent. The patient is able to name, repeat and comprehend.   Memory: There is intact recollection of recent and remote events.   Cranial nerve exam: Pupils are 3-4 mm bilaterally and equally reactive to light and accommodation. Visual fields are intact by confrontation. Fundoscopic exam was unremarkable. There is no nystagmus on primary or secondary gaze. Intact full EOM in all directions of gaze. Face appears symmetric. Sensation in the face is intact to light touch. Uvula is midline. Palate elevates symmetrically. Tongue is midline and without any signs of tongue biting or fasciculations. Sternocleidomastoid muscles exhibit is normal strength bilaterally. Shoulder shrug is intact bilaterally.   Motor exam: Strength is 5/5 in all extremities. Tone is normal.  Left hand greater than right tremor with action  Sensory exam reveals normal sense of light touch, proprioception, vibration and " pinprick in all extremities.   Deep tendon reflexes:  2+ throughout. Plantar responses are flexor. There is no clonus.   Coordination: shows a normal finger-nose-finger. Normal rapidly alternating movements.   Gait: The patient was able to get up from seated position on first attempt without requiring assistance. Found to be steady when walking. Movements were fluid with normal arm swing. The patient was able to turn without difficulties or tendency to fall. Romberg examination pos      ANCILLARY DATA REVIEWED:     Lab Data Review:  No results found for this or any previous visit (from the past 24 hour(s)).    Records reviewed: Per medical record and all laboratory evaluation in addition to Dr. Armas's notes.      Imaging: HISTORY/REASON FOR EXAM:  Chronic headache, memory loss.        TECHNIQUE/EXAM DESCRIPTION:  MRI of the brain without contrast,     T1 3D TFE sagittal, T2 Drive Turbo spin-echo axial, T1 coronal, 3-D thin section FLAIR with coronal and optional axial, sagittal reconstructions, Diffusion weighted and Apparent Diffusion Coefficient (ADC map) axial images were obtained of the whole   brain. Additional thin section T2 fast spin echo coronal images were also obtained to display the temporal lobes and  hippocampi.        The study was performed on a Siemens Skyra 3.0 Raisa MRI scanner.     COMPARISON:  Head CT 4/20/2021     FINDINGS:     The diffusion weighted axial images show no evidence of acute cerebral infarction.     There are no acute hemorrhagic lesions.     There are prominent perivascular spaces in the supratentorial brain.     There are sparse scattered foci of T2 prolongation in the RIGHT frontal white matter which are nonspecific but which most likely reflect areas of chronic microangiopathic ischemic change, though this can also be seen with gliosis and demyelinating   processes.     Vascular flow voids in the large intracranial arteries are intact.  Vascular flow voids in the dural  venous sinuses are intact.     The ventricular system and basal cisterns are within normal limits.     There are no mass effects or shift of midline structures.     There are no extra-axial fluid collections.           The calvariae are unremarkable.  There is mucosal thickening in the BILATERAL maxillary sinuses.  The mastoid air cells are unremarkable.  The orbits are unremarkable.        IMPRESSION:     MRI of the brain without contrast within normal limits for age with scant RIGHT frontal white matter changes.          ASSESSMENT AND PLAN:    1. Tremor  Plan for trial of primidone starting with one half at bedtime and advancing per Dr. Armas's taper schedule.  Patient is scheduled to discuss with the pharmacist any interaction with her birth control pill.  - primidone (MYSOLINE) 50 MG Tab; Take 1 Tablet by mouth 2 times a day for 90 days.  Dispense: 180 Tablet; Refill: 0  - MR-BRAIN-WITH & W/O; Future    2. Abnormal MRI of head  Plan to do a brain scan to look for any changes depending on how symptoms change or add approximately a year from the previous scan.  - MR-BRAIN-WITH & W/O; Future    3. Physiological tremor  Trial of primidone discussed    4. Paresthesias  Patient has had autoimmune tests that have been unremarkable to date.  However we will keep an eye on this and patient will try to do lifestyle modification techniques to see if she can minimize the discomfort of the symptoms.  Best diet, exercise and mindfulness techniques.  We also discussed possible supplementation with coenzyme Q 10, MAgnesium and B vitamins.        FOLLOW-UP:   After the MRI scan      My total time spent caring for the patient on the day of the encounter was 48 minutes.   This does not include time spent on separately billable procedures/tests.     EDUCATION AND COUNSELING:  -Discussed regular exercise program and prevention of cardiovascular disease, including stroke.   -Discussed healthy lifestyle, including: healthy diet  (rich in fruits, vegetables, nuts and healthy oils); proper hydration, and adequate sleep hygiene (allowing 7-8 hrs of overnight sleep).        Melissa Bloch, MD  Clinical  of Neurology [facility].   Diplomate in Neurology.   Office: [phone]  Fax: [phone]          No

## 2024-11-27 NOTE — OB RN DELIVERY SUMMARY - BABY A: APGAR 1 MIN SCORE, DELIVERY
Patient called and LVM stating that she spoke with Dr. Moseley regarding an rx of lasix for fluid in her knees and was asking if Dr. Moseley was going to send that in.    Per Dr. Moseley he informed patient that she should discuss this with her PCP and is not prescribing lasix.    Called patient back to inform her of what stated by Dr. Moseley.    Patient voiced a clear understanding.   
9

## 2024-12-12 ENCOUNTER — ASOB RESULT (OUTPATIENT)
Age: 34
End: 2024-12-12

## 2024-12-12 ENCOUNTER — APPOINTMENT (OUTPATIENT)
Dept: ANTEPARTUM | Facility: CLINIC | Age: 34
End: 2024-12-12

## 2024-12-12 PROCEDURE — 76801 OB US < 14 WKS SINGLE FETUS: CPT | Mod: 59

## 2024-12-12 PROCEDURE — 76813 OB US NUCHAL MEAS 1 GEST: CPT

## 2025-02-06 ENCOUNTER — ASOB RESULT (OUTPATIENT)
Age: 35
End: 2025-02-06

## 2025-02-06 ENCOUNTER — APPOINTMENT (OUTPATIENT)
Dept: ANTEPARTUM | Facility: CLINIC | Age: 35
End: 2025-02-06
Payer: COMMERCIAL

## 2025-02-06 PROCEDURE — 76811 OB US DETAILED SNGL FETUS: CPT

## 2025-02-13 ENCOUNTER — APPOINTMENT (OUTPATIENT)
Dept: ANTEPARTUM | Facility: CLINIC | Age: 35
End: 2025-02-13

## 2025-02-13 ENCOUNTER — ASOB RESULT (OUTPATIENT)
Age: 35
End: 2025-02-13

## 2025-02-13 PROCEDURE — 76816 OB US FOLLOW-UP PER FETUS: CPT

## 2025-03-13 NOTE — DISCHARGE NOTE OB - USE THE FOOD PYRAMID (LOCATED IN DISCHARGE MATERIALS PROVIDED) AS A GUIDE TO BALANCE AND MODERATION
Statement Selected
Detail Level: Detailed
Size Of Lesion In Cm (Optional): 0
Procedure To Be Performed At Next Visit: Biopsy by punch method
Introduction Text (Please End With A Colon): The following procedure was deferred:

## 2025-04-03 ENCOUNTER — ASOB RESULT (OUTPATIENT)
Age: 35
End: 2025-04-03

## 2025-04-03 ENCOUNTER — APPOINTMENT (OUTPATIENT)
Dept: ANTEPARTUM | Facility: CLINIC | Age: 35
End: 2025-04-03
Payer: COMMERCIAL

## 2025-04-03 PROCEDURE — 76816 OB US FOLLOW-UP PER FETUS: CPT

## 2025-05-05 ENCOUNTER — OUTPATIENT (OUTPATIENT)
Dept: OUTPATIENT SERVICES | Facility: HOSPITAL | Age: 35
LOS: 1 days | End: 2025-05-05
Payer: COMMERCIAL

## 2025-05-05 VITALS — OXYGEN SATURATION: 99 %

## 2025-05-05 VITALS — OXYGEN SATURATION: 94 % | HEART RATE: 85 BPM

## 2025-05-05 DIAGNOSIS — O26.899 OTHER SPECIFIED PREGNANCY RELATED CONDITIONS, UNSPECIFIED TRIMESTER: ICD-10-CM

## 2025-05-05 PROCEDURE — G0463: CPT

## 2025-05-05 NOTE — OB PROVIDER TRIAGE NOTE - ADDITIONAL INSTRUCTIONS
Discharge to home with labor precautions: return to triage if patient experiences decreased FM, VB, LOF, or painful, consistent ctxs.  F/U in office in 1 week

## 2025-05-05 NOTE — OB PROVIDER TRIAGE NOTE - NSOBPROVIDERNOTE_OBGYN_ALL_OB_FT
Assessment  34y  @ 33w1d presents for decreased FM x 3 days.    Plan  1. Admit to L+D. Routine Labs. IVF.  2. Expectant management/IOL w/ ___.  3. Fetus: cat 1 tracing. VTX. EFW _g by sono. Continuous EFM. Sono. No concerns.  4. Prenatal issues: none  5. GBS (+/-/unknown)  6. Pain: IV pain meds/epidural PRN    Discharge to home with labor precautions: return to triage if patient experiences decreased FM, VB, LOF, or painful, consistent ctxs.    Plan per attending physician,  ____    Jami Yanes MD  PGY1 Assessment  34y  @ 33w1d presents for decreased FM x 3 days.    Plan  -Monitor FHT/Circle  -BPP  JAMILA 17.5  -Recheck VE again in 1 hr given contractions on toco. Pt can not feel ctxs.    Plan per attending physician, Dr. Lucille Yanes MD  PGY1 Assessment  34y  @ 33w1d presents for decreased FM x 3 days.    Plan  -Monitor FHT/[place]  -BPP  JAMILA 17.5  -Recheck VE again in 1 hr given contractions on toco. Pt can not feel ctxs.    Plan per attending physician, Dr. Lucille Yanes MD  PGY1    Update:  Pt continues to report feeling FM. VE unchanged at 0cm.  Discharge to home with labor precautions: return to triage if patient experiences decreased FM, VB, LOF, or painful, consistent ctxs.    Plan per attending physician, Dr. Lucille Yanes MD  PGY1

## 2025-05-05 NOTE — OB PROVIDER TRIAGE NOTE - HISTORY OF PRESENT ILLNESS
Subjective  HPI: 34y  @ 33w1d presents for decreased FM x 3 days.  Pt reports decreased intensity in FM since Saturday 5/3. Pt reports she does feel intermittent FM, just not as strong as usual. Pt reports feeling good FM in triage today.  -LOF. -CTXs. -VB. Pt denies any other concerns.    – PNC: Denies prenatal issues. GBS unk.  – OBHx:  FT  6#14  – GynHx: denies fibroids, cysts, endometriosis, abnormal pap smears, STIs  – PMH: denies cardiac, pulmonary, neurological, bleeding/clotting disorders  – PSH: denies  – Psych: denies   – Social: denies   – Meds: PNV   – Allergies: NKDA    Objective  – VS  T(C): 36.6 (25 @ 16:13)  HR: 90 (25 @ 16:35)  BP: 117/67 (25 @ 16:13)  RR: 18 (25 @ 16:13)  SpO2: 98% (25 @ 16:35)    Physical Exam  CV: RRR  Pulm: breathing comfortably on RA  Abd: gravid, nontender  Extr: moving all extremities with ease  – FS:   – Spec: pooling, nitrazine, ferning, bleeding,  (lesions if patient with HSV2 history)  – VE: //  – FHT: baseline 1, mod variability, +accels, -decels  – [place]: qmin  – EFW: _g by sono  – Sono: vertex     Subjective  HPI: 34y  @ 33w1d presents for decreased FM x 3 days.  Pt reports decreased intensity in FM since Saturday 5/3. Pt reports she does feel intermittent FM, just not as strong as usual. Pt reports feeling good FM in triage today.  -LOF. -CTXs. -VB. Pt denies any other concerns.    – PNC: Denies prenatal issues. GBS unk.  – OBHx:  FT  6#14  – GynHx: denies fibroids, cysts, endometriosis, abnormal pap smears, STIs  – PMH: denies cardiac, pulmonary, neurological, bleeding/clotting disorders  – PSH: denies  – Psych: denies   – Social: denies   – Meds: PNV   – Allergies: NKDA    Objective  – VS  T(C): 36.6 (25 @ 16:13)  HR: 90 (25 @ 16:35)  BP: 117/67 (25 @ 16:13)  RR: 18 (25 @ 16:13)  SpO2: 98% (25 @ 16:35)    Physical Exam  CV: RRR  Pulm: breathing comfortably on RA  Abd: gravid, nontender  Extr: moving all extremities with ease  – VE: 0/0/-3  – FHT: baseline 125, mod variability, +accels, -decels  – [place]: q5min  – Sono: vertex, BPP 8/8, JAMILA 17.5

## 2025-05-05 NOTE — OB PROVIDER TRIAGE NOTE - ATTENDING COMMENTS
Covering Ob/Gyn Attending (Mon 8 am-8 pm)   Patient seen, examined-agree with resident's assessment.    HPI: 34y  @ 33w1d presents for decreased FM x 3 days.  On arrival, tracing was Category 1, but contractions were noted that patient did not feel.     – PNC: Denies prenatal issues. GBS unk.  – OBHx:  FT  6#14  – GynHx: denies fibroids, cysts, endometriosis, abnormal pap smears, STIs  – PMH: denies cardiac, pulmonary, neurological, bleeding/clotting disorders  – PSH: denies      Objective    – FHT: baseline 125, mod variability, +accels, -decels  – [place]: q5-7 min  – Sono: vertex, BPP 8/8, JAMILA 17.5    Patient was left on the monitor for 1+ hour. She was hydrated PO and contractions spaced out, although still present.   Patient can't feel them. I repeated vaginal exam and patient was still closed.   Will discharge patient with instructions on symptoms of  labor, and to call the office tomorrow to schedule NST, repeat exam in few days.   Advised to call anytime, if symptoms of  labor occur.

## 2025-05-08 DIAGNOSIS — Z3A.33 33 WEEKS GESTATION OF PREGNANCY: ICD-10-CM

## 2025-05-08 DIAGNOSIS — O36.8130 DECREASED FETAL MOVEMENTS, THIRD TRIMESTER, NOT APPLICABLE OR UNSPECIFIED: ICD-10-CM

## 2025-05-29 ENCOUNTER — APPOINTMENT (OUTPATIENT)
Dept: ANTEPARTUM | Facility: CLINIC | Age: 35
End: 2025-05-29
Payer: COMMERCIAL

## 2025-05-29 ENCOUNTER — ASOB RESULT (OUTPATIENT)
Age: 35
End: 2025-05-29

## 2025-05-29 PROCEDURE — 76816 OB US FOLLOW-UP PER FETUS: CPT

## 2025-06-02 ENCOUNTER — ASOB RESULT (OUTPATIENT)
Age: 35
End: 2025-06-02

## 2025-06-02 ENCOUNTER — APPOINTMENT (OUTPATIENT)
Dept: ANTEPARTUM | Facility: CLINIC | Age: 35
End: 2025-06-02
Payer: COMMERCIAL

## 2025-06-02 PROCEDURE — 76818 FETAL BIOPHYS PROFILE W/NST: CPT | Mod: 59

## 2025-06-02 PROCEDURE — 76820 UMBILICAL ARTERY ECHO: CPT | Mod: 59

## 2025-06-02 PROCEDURE — 76816 OB US FOLLOW-UP PER FETUS: CPT

## 2025-06-02 PROCEDURE — 99203 OFFICE O/P NEW LOW 30 MIN: CPT | Mod: 25

## 2025-06-06 ENCOUNTER — APPOINTMENT (OUTPATIENT)
Dept: ANTEPARTUM | Facility: HOSPITAL | Age: 35
End: 2025-06-06

## 2025-06-06 ENCOUNTER — ASOB RESULT (OUTPATIENT)
Age: 35
End: 2025-06-06

## 2025-06-06 ENCOUNTER — APPOINTMENT (OUTPATIENT)
Dept: ANTEPARTUM | Facility: CLINIC | Age: 35
End: 2025-06-06
Payer: COMMERCIAL

## 2025-06-06 ENCOUNTER — OUTPATIENT (OUTPATIENT)
Dept: OUTPATIENT SERVICES | Facility: HOSPITAL | Age: 35
LOS: 1 days | End: 2025-06-06
Payer: COMMERCIAL

## 2025-06-06 VITALS
TEMPERATURE: 98 F | RESPIRATION RATE: 18 BRPM | HEART RATE: 78 BPM | DIASTOLIC BLOOD PRESSURE: 53 MMHG | SYSTOLIC BLOOD PRESSURE: 98 MMHG

## 2025-06-06 VITALS — OXYGEN SATURATION: 95 %

## 2025-06-06 DIAGNOSIS — O32.9XX0 MATERNAL CARE FOR MALPRESENTATION OF FETUS, UNSPECIFIED, NOT APPLICABLE OR UNSPECIFIED: ICD-10-CM

## 2025-06-06 DIAGNOSIS — O26.899 OTHER SPECIFIED PREGNANCY RELATED CONDITIONS, UNSPECIFIED TRIMESTER: ICD-10-CM

## 2025-06-06 LAB
BLD GP AB SCN SERPL QL: NEGATIVE — SIGNIFICANT CHANGE UP
HCT VFR BLD CALC: 38.9 % — SIGNIFICANT CHANGE UP (ref 34.5–45)
HGB BLD-MCNC: 12.7 G/DL — SIGNIFICANT CHANGE UP (ref 11.5–15.5)
MCHC RBC-ENTMCNC: 26.6 PG — LOW (ref 27–34)
MCHC RBC-ENTMCNC: 32.6 G/DL — SIGNIFICANT CHANGE UP (ref 32–36)
MCV RBC AUTO: 81.4 FL — SIGNIFICANT CHANGE UP (ref 80–100)
NRBC BLD AUTO-RTO: 0 /100 WBCS — SIGNIFICANT CHANGE UP (ref 0–0)
PLATELET # BLD AUTO: 281 K/UL — SIGNIFICANT CHANGE UP (ref 150–400)
RBC # BLD: 4.78 M/UL — SIGNIFICANT CHANGE UP (ref 3.8–5.2)
RBC # FLD: 16.6 % — HIGH (ref 10.3–14.5)
RH IG SCN BLD-IMP: NEGATIVE — SIGNIFICANT CHANGE UP
WBC # BLD: 9.66 K/UL — SIGNIFICANT CHANGE UP (ref 3.8–10.5)
WBC # FLD AUTO: 9.66 K/UL — SIGNIFICANT CHANGE UP (ref 3.8–10.5)

## 2025-06-06 PROCEDURE — 86901 BLOOD TYPING SEROLOGIC RH(D): CPT

## 2025-06-06 PROCEDURE — 96361 HYDRATE IV INFUSION ADD-ON: CPT

## 2025-06-06 PROCEDURE — 86900 BLOOD TYPING SEROLOGIC ABO: CPT

## 2025-06-06 PROCEDURE — 85027 COMPLETE CBC AUTOMATED: CPT

## 2025-06-06 PROCEDURE — 76815 OB US LIMITED FETUS(S): CPT | Mod: 26

## 2025-06-06 PROCEDURE — 76815 OB US LIMITED FETUS(S): CPT

## 2025-06-06 PROCEDURE — 59412 ANTEPARTUM MANIPULATION: CPT

## 2025-06-06 PROCEDURE — 96372 THER/PROPH/DIAG INJ SC/IM: CPT

## 2025-06-06 PROCEDURE — 76818 FETAL BIOPHYS PROFILE W/NST: CPT | Mod: 26

## 2025-06-06 PROCEDURE — 86850 RBC ANTIBODY SCREEN: CPT

## 2025-06-06 PROCEDURE — 76818 FETAL BIOPHYS PROFILE W/NST: CPT

## 2025-06-06 RX ORDER — SODIUM CHLORIDE 9 G/1000ML
1000 INJECTION, SOLUTION INTRAVENOUS
Refills: 0 | Status: ACTIVE | OUTPATIENT
Start: 2025-06-06 | End: 2026-05-05

## 2025-06-06 RX ADMIN — SODIUM CHLORIDE 125 MILLILITER(S): 9 INJECTION, SOLUTION INTRAVENOUS at 09:30

## 2025-06-06 NOTE — OB PROVIDER TRIAGE NOTE - NSHPPHYSICALEXAM_GEN_ALL_CORE
Objective  Vital Signs Last 24 Hrs  T(C): 36.7 (06 Jun 2025 09:05), Max: 36.7 (06 Jun 2025 08:54)  T(F): 98.1 (06 Jun 2025 09:05), Max: 98.1 (06 Jun 2025 09:05)  HR: 72 (06 Jun 2025 09:23) (72 - 89)  BP: 109/57 (06 Jun 2025 09:05) (109/57 - 109/57)  BP(mean): --  RR: 16 (06 Jun 2025 09:05) (16 - 16)  SpO2: 98% (06 Jun 2025 09:23) (95% - 99%)    Parameters below as of 06 Jun 2025 09:05  Patient On (Oxygen Delivery Method): room air        PE:   CV: clinically well perfused  Pulm: breathing comfortably on RA  Abd: gravid, nontender  Extr: moving all extremities with ease     FHT: baseline 120, mod variability, +accels, -decels  [place]: irreg contractions  Sono: transverse head mat R, back up

## 2025-06-06 NOTE — OB PROVIDER TRIAGE NOTE - NSOBPROVIDERNOTE_OBGYN_ALL_OB_FT
Assessment  33yo  at 37w5d presents for scheduled ECV for fetal transverse presentations. Opts to try ECV without anesthesia.    Plan  - NST  - IV, labs  - NPO    Plan for ECV with MFM  Plan per Dr. Oren Lott,  Natalya Sheppard, PGY3

## 2025-06-06 NOTE — OB PROVIDER TRIAGE NOTE - HISTORY OF PRESENT ILLNESS
R3 L&D Triage    Subjective  HPI: 35yo  at 37w5d presents for scheduled ECV. +FM. -LOF. -CTXs. -VB. Pt denies any other concerns. Last ate last night.    Name of OB:Hansen  PNC: Fetal malpresentation. Otherwise, denies prenatal issues.  OBHx: FT  x1  GynHx: denies  PMH: Asthma, last used inhaler this week, no hospitalizations  PSH: denies  Meds: PNV, Albuterol PRN   Allergies: NKDA  Social: denies substance us R3 L&D Triage    Subjective  HPI: 35yo  at 37w5d presents for scheduled ECV. +FM. -LOF. -CTXs. -VB. Pt denies any other concerns. Last ate last night.    Name of OB: Tyrone  PNC:    - Fetal malpresentation    - FGR dx , EFW 2527 (14%) with AC 6%, UA dopplers wnl  OBHx: FT  x1 , 6#14  GynHx: denies  PMH: Asthma, last used inhaler this week, no hospitalizations  PSH: denies  Meds: PNV, Albuterol PRN   Allergies: NKDA  Social: denies substance us

## 2025-06-06 NOTE — PROCEDURE NOTE - ADDITIONAL PROCEDURE DETAILS
External Cephalic Version    Preprocedure diagnosis: Transverse head maternal right spine up presentation  Postprocedure diagnosis: Transverse head maternal right spine up presentation  Procedure: External Cephalic Version  Findings: Stable FHR pre and post procedure    Clinical Note:  Patient is a 35 yo  at 37w5d who presented to L&D for a scheduled external cephalic version. A reactive fetal heart tracing was obtained and transverse presentation was confirmed on sonogram (head maternal right, spine up, JAMILA wnl). Risks of external cephalic version, including abnormal FHR, PROM, abruption, fetal injury, possible emergent  section and failed external cephalic version were discussed with the patient and informed consent was obtained.    Procedure Note:  The fetal feet were noted as presenting parts, spine along upper abdomen, and fetal head at maternal right. The presentation portion of the fetus was elevated out of the maternal pelvis, and using gentle but firm pressure of the 's hands on fetal buttocks and posterior occiput to move fetus is a counter-clockwise motion to bring the fetal head to the maternal pelvis. Despite several attempts, we were unable to successfully move the fetus to cephalic presentation as the feet remained in the lower uterine segment. Patient tolerated the procedure well. She will be monitored for a prolonged period of time to assess for fetal heart rate abnormalities. She will have rhogam prior to discharge and close follow-up at [address] [place] [address] (already scheduled).     Dr.Tam Lott at bedside

## 2025-06-09 DIAGNOSIS — O09.523 SUPERVISION OF ELDERLY MULTIGRAVIDA, THIRD TRIMESTER: ICD-10-CM

## 2025-06-09 DIAGNOSIS — O99.343 OTHER MENTAL DISORDERS COMPLICATING PREGNANCY, THIRD TRIMESTER: ICD-10-CM

## 2025-06-09 DIAGNOSIS — O36.5930 MATERNAL CARE FOR OTHER KNOWN OR SUSPECTED POOR FETAL GROWTH, THIRD TRIMESTER, NOT APPLICABLE OR UNSPECIFIED: ICD-10-CM

## 2025-06-09 DIAGNOSIS — Z3A.37 37 WEEKS GESTATION OF PREGNANCY: ICD-10-CM

## 2025-06-09 DIAGNOSIS — O32.0XX0 MATERNAL CARE FOR UNSTABLE LIE, NOT APPLICABLE OR UNSPECIFIED: ICD-10-CM

## 2025-06-09 DIAGNOSIS — F32.A DEPRESSION, UNSPECIFIED: ICD-10-CM

## 2025-06-09 PROBLEM — J45.909 UNSPECIFIED ASTHMA, UNCOMPLICATED: Chronic | Status: ACTIVE | Noted: 2025-06-06

## 2025-06-10 ENCOUNTER — APPOINTMENT (OUTPATIENT)
Dept: ANTEPARTUM | Facility: CLINIC | Age: 35
End: 2025-06-10

## 2025-06-10 ENCOUNTER — ASOB RESULT (OUTPATIENT)
Age: 35
End: 2025-06-10

## 2025-06-10 DIAGNOSIS — O09.523 SUPERVISION OF ELDERLY MULTIGRAVIDA, THIRD TRIMESTER: ICD-10-CM

## 2025-06-10 DIAGNOSIS — O32.8XX0 MATERNAL CARE FOR OTHER MALPRESENTATION OF FETUS, NOT APPLICABLE OR UNSPECIFIED: ICD-10-CM

## 2025-06-10 DIAGNOSIS — O09.899 SUPERVISION OF OTHER HIGH RISK PREGNANCIES, UNSPECIFIED TRIMESTER: ICD-10-CM

## 2025-06-10 DIAGNOSIS — Z3A.37 37 WEEKS GESTATION OF PREGNANCY: ICD-10-CM

## 2025-06-10 PROCEDURE — 76816 OB US FOLLOW-UP PER FETUS: CPT

## 2025-06-10 PROCEDURE — 99213 OFFICE O/P EST LOW 20 MIN: CPT | Mod: 25

## 2025-06-10 PROCEDURE — 76820 UMBILICAL ARTERY ECHO: CPT | Mod: 59

## 2025-06-10 PROCEDURE — 76818 FETAL BIOPHYS PROFILE W/NST: CPT

## 2025-06-12 ENCOUNTER — TRANSCRIPTION ENCOUNTER (OUTPATIENT)
Age: 35
End: 2025-06-12

## 2025-06-12 ENCOUNTER — INPATIENT (INPATIENT)
Facility: HOSPITAL | Age: 35
LOS: 1 days | Discharge: HOME | DRG: 833 | End: 2025-06-14
Attending: STUDENT IN AN ORGANIZED HEALTH CARE EDUCATION/TRAINING PROGRAM | Admitting: STUDENT IN AN ORGANIZED HEALTH CARE EDUCATION/TRAINING PROGRAM
Payer: COMMERCIAL

## 2025-06-12 VITALS — DIASTOLIC BLOOD PRESSURE: 56 MMHG | SYSTOLIC BLOOD PRESSURE: 114 MMHG | HEART RATE: 78 BPM

## 2025-06-12 DIAGNOSIS — O36.5990 MATERNAL CARE FOR OTHER KNOWN OR SUSPECTED POOR FETAL GROWTH, UNSPECIFIED TRIMESTER, NOT APPLICABLE OR UNSPECIFIED: ICD-10-CM

## 2025-06-12 LAB
BASOPHILS # BLD AUTO: 0.04 K/UL — SIGNIFICANT CHANGE UP (ref 0–0.2)
BASOPHILS NFR BLD AUTO: 0.4 % — SIGNIFICANT CHANGE UP (ref 0–2)
BLD GP AB SCN SERPL QL: POSITIVE — SIGNIFICANT CHANGE UP
EOSINOPHIL # BLD AUTO: 0.1 K/UL — SIGNIFICANT CHANGE UP (ref 0–0.5)
EOSINOPHIL NFR BLD AUTO: 1 % — SIGNIFICANT CHANGE UP (ref 0–6)
HCT VFR BLD CALC: 38.8 % — SIGNIFICANT CHANGE UP (ref 34.5–45)
HGB BLD-MCNC: 12.4 G/DL — SIGNIFICANT CHANGE UP (ref 11.5–15.5)
IMM GRANULOCYTES NFR BLD AUTO: 0.6 % — SIGNIFICANT CHANGE UP (ref 0–0.9)
LYMPHOCYTES # BLD AUTO: 2.96 K/UL — SIGNIFICANT CHANGE UP (ref 1–3.3)
LYMPHOCYTES # BLD AUTO: 30.8 % — SIGNIFICANT CHANGE UP (ref 13–44)
MCHC RBC-ENTMCNC: 26.2 PG — LOW (ref 27–34)
MCHC RBC-ENTMCNC: 32 G/DL — SIGNIFICANT CHANGE UP (ref 32–36)
MCV RBC AUTO: 82 FL — SIGNIFICANT CHANGE UP (ref 80–100)
MONOCYTES # BLD AUTO: 0.76 K/UL — SIGNIFICANT CHANGE UP (ref 0–0.9)
MONOCYTES NFR BLD AUTO: 7.9 % — SIGNIFICANT CHANGE UP (ref 2–14)
NEUTROPHILS # BLD AUTO: 5.69 K/UL — SIGNIFICANT CHANGE UP (ref 1.8–7.4)
NEUTROPHILS NFR BLD AUTO: 59.3 % — SIGNIFICANT CHANGE UP (ref 43–77)
NRBC BLD AUTO-RTO: 0 /100 WBCS — SIGNIFICANT CHANGE UP (ref 0–0)
PLATELET # BLD AUTO: 313 K/UL — SIGNIFICANT CHANGE UP (ref 150–400)
RBC # BLD: 4.73 M/UL — SIGNIFICANT CHANGE UP (ref 3.8–5.2)
RBC # FLD: 17.4 % — HIGH (ref 10.3–14.5)
RH IG SCN BLD-IMP: NEGATIVE — SIGNIFICANT CHANGE UP
WBC # BLD: 9.61 K/UL — SIGNIFICANT CHANGE UP (ref 3.8–10.5)
WBC # FLD AUTO: 9.61 K/UL — SIGNIFICANT CHANGE UP (ref 3.8–10.5)

## 2025-06-12 PROCEDURE — 86077 PHYS BLOOD BANK SERV XMATCH: CPT

## 2025-06-12 PROCEDURE — 88307 TISSUE EXAM BY PATHOLOGIST: CPT | Mod: 26

## 2025-06-12 RX ORDER — SODIUM CHLORIDE 9 G/1000ML
1000 INJECTION, SOLUTION INTRAVENOUS
Refills: 0 | Status: DISCONTINUED | OUTPATIENT
Start: 2025-06-12 | End: 2025-06-14

## 2025-06-12 RX ORDER — NALBUPHINE HYDROCHLORIDE 10 MG/ML
2.5 INJECTION INTRAMUSCULAR; INTRAVENOUS; SUBCUTANEOUS EVERY 6 HOURS
Refills: 0 | Status: DISCONTINUED | OUTPATIENT
Start: 2025-06-12 | End: 2025-06-13

## 2025-06-12 RX ORDER — OXYCODONE HYDROCHLORIDE 30 MG/1
5 TABLET ORAL
Refills: 0 | Status: DISCONTINUED | OUTPATIENT
Start: 2025-06-12 | End: 2025-06-13

## 2025-06-12 RX ORDER — NALOXONE HYDROCHLORIDE 0.4 MG/ML
0.1 INJECTION, SOLUTION INTRAMUSCULAR; INTRAVENOUS; SUBCUTANEOUS
Refills: 0 | Status: DISCONTINUED | OUTPATIENT
Start: 2025-06-12 | End: 2025-06-13

## 2025-06-12 RX ORDER — SODIUM CHLORIDE 9 G/1000ML
1000 INJECTION, SOLUTION INTRAVENOUS
Refills: 0 | Status: DISCONTINUED | OUTPATIENT
Start: 2025-06-12 | End: 2025-06-13

## 2025-06-12 RX ORDER — SIMETHICONE 80 MG
80 TABLET,CHEWABLE ORAL EVERY 4 HOURS
Refills: 0 | Status: DISCONTINUED | OUTPATIENT
Start: 2025-06-12 | End: 2025-06-14

## 2025-06-12 RX ORDER — MODIFIED LANOLIN 100 %
1 CREAM (GRAM) TOPICAL EVERY 6 HOURS
Refills: 0 | Status: DISCONTINUED | OUTPATIENT
Start: 2025-06-12 | End: 2025-06-14

## 2025-06-12 RX ORDER — OXYCODONE HYDROCHLORIDE 30 MG/1
5 TABLET ORAL ONCE
Refills: 0 | Status: DISCONTINUED | OUTPATIENT
Start: 2025-06-12 | End: 2025-06-13

## 2025-06-12 RX ORDER — KETOROLAC TROMETHAMINE 30 MG/ML
30 INJECTION, SOLUTION INTRAMUSCULAR; INTRAVENOUS EVERY 6 HOURS
Refills: 0 | Status: DISCONTINUED | OUTPATIENT
Start: 2025-06-12 | End: 2025-06-13

## 2025-06-12 RX ORDER — ONDANSETRON HCL/PF 4 MG/2 ML
4 VIAL (ML) INJECTION EVERY 6 HOURS
Refills: 0 | Status: DISCONTINUED | OUTPATIENT
Start: 2025-06-12 | End: 2025-06-13

## 2025-06-12 RX ORDER — DIPHENHYDRAMINE HCL 12.5MG/5ML
25 ELIXIR ORAL EVERY 6 HOURS
Refills: 0 | Status: DISCONTINUED | OUTPATIENT
Start: 2025-06-12 | End: 2025-06-14

## 2025-06-12 RX ORDER — IBUPROFEN 200 MG
600 TABLET ORAL EVERY 6 HOURS
Refills: 0 | Status: COMPLETED | OUTPATIENT
Start: 2025-06-12 | End: 2026-05-11

## 2025-06-12 RX ORDER — OXYTOCIN-SODIUM CHLORIDE 0.9% IV SOLN 30 UNIT/500ML 30-0.9/5 UT/ML-%
42 SOLUTION INTRAVENOUS
Qty: 30 | Refills: 0 | Status: DISCONTINUED | OUTPATIENT
Start: 2025-06-12 | End: 2025-06-14

## 2025-06-12 RX ORDER — CITRIC ACID/SODIUM CITRATE 300-500 MG
30 SOLUTION, ORAL ORAL ONCE
Refills: 0 | Status: COMPLETED | OUTPATIENT
Start: 2025-06-12 | End: 2025-06-12

## 2025-06-12 RX ORDER — DEXAMETHASONE 0.5 MG/1
4 TABLET ORAL EVERY 6 HOURS
Refills: 0 | Status: DISCONTINUED | OUTPATIENT
Start: 2025-06-12 | End: 2025-06-13

## 2025-06-12 RX ORDER — HEPARIN SODIUM 1000 [USP'U]/ML
5000 INJECTION INTRAVENOUS; SUBCUTANEOUS EVERY 12 HOURS
Refills: 0 | Status: DISCONTINUED | OUTPATIENT
Start: 2025-06-12 | End: 2025-06-14

## 2025-06-12 RX ORDER — MAGNESIUM HYDROXIDE 400 MG/5ML
30 SUSPENSION ORAL
Refills: 0 | Status: DISCONTINUED | OUTPATIENT
Start: 2025-06-12 | End: 2025-06-14

## 2025-06-12 RX ORDER — ACETAMINOPHEN 500 MG/5ML
975 LIQUID (ML) ORAL
Refills: 0 | Status: DISCONTINUED | OUTPATIENT
Start: 2025-06-12 | End: 2025-06-14

## 2025-06-12 RX ADMIN — Medication 20 MILLIGRAM(S): at 13:45

## 2025-06-12 RX ADMIN — KETOROLAC TROMETHAMINE 30 MILLIGRAM(S): 30 INJECTION, SOLUTION INTRAMUSCULAR; INTRAVENOUS at 22:57

## 2025-06-12 RX ADMIN — NALBUPHINE HYDROCHLORIDE 2.5 MILLIGRAM(S): 10 INJECTION INTRAMUSCULAR; INTRAVENOUS; SUBCUTANEOUS at 21:23

## 2025-06-12 RX ADMIN — Medication 30 MILLILITER(S): at 13:45

## 2025-06-13 ENCOUNTER — APPOINTMENT (OUTPATIENT)
Dept: ANTEPARTUM | Facility: CLINIC | Age: 35
End: 2025-06-13

## 2025-06-13 LAB
BASOPHILS # BLD AUTO: 0.03 K/UL — SIGNIFICANT CHANGE UP (ref 0–0.2)
BASOPHILS NFR BLD AUTO: 0.2 % — SIGNIFICANT CHANGE UP (ref 0–2)
EOSINOPHIL # BLD AUTO: 0 K/UL — SIGNIFICANT CHANGE UP (ref 0–0.5)
EOSINOPHIL NFR BLD AUTO: 0 % — SIGNIFICANT CHANGE UP (ref 0–6)
HCT VFR BLD CALC: 36.4 % — SIGNIFICANT CHANGE UP (ref 34.5–45)
HGB BLD-MCNC: 11.9 G/DL — SIGNIFICANT CHANGE UP (ref 11.5–15.5)
IMM GRANULOCYTES NFR BLD AUTO: 0.4 % — SIGNIFICANT CHANGE UP (ref 0–0.9)
KLEIHAUER-BETKE CALCULATION: 0 % — SIGNIFICANT CHANGE UP (ref 0–0.2)
LYMPHOCYTES # BLD AUTO: 16.3 % — SIGNIFICANT CHANGE UP (ref 13–44)
LYMPHOCYTES # BLD AUTO: 2.55 K/UL — SIGNIFICANT CHANGE UP (ref 1–3.3)
MCHC RBC-ENTMCNC: 26.6 PG — LOW (ref 27–34)
MCHC RBC-ENTMCNC: 32.7 G/DL — SIGNIFICANT CHANGE UP (ref 32–36)
MCV RBC AUTO: 81.4 FL — SIGNIFICANT CHANGE UP (ref 80–100)
MONOCYTES # BLD AUTO: 0.86 K/UL — SIGNIFICANT CHANGE UP (ref 0–0.9)
MONOCYTES NFR BLD AUTO: 5.5 % — SIGNIFICANT CHANGE UP (ref 2–14)
NEUTROPHILS # BLD AUTO: 12.17 K/UL — HIGH (ref 1.8–7.4)
NEUTROPHILS NFR BLD AUTO: 77.6 % — HIGH (ref 43–77)
NRBC BLD AUTO-RTO: 0 /100 WBCS — SIGNIFICANT CHANGE UP (ref 0–0)
PLATELET # BLD AUTO: 295 K/UL — SIGNIFICANT CHANGE UP (ref 150–400)
RBC # BLD: 4.47 M/UL — SIGNIFICANT CHANGE UP (ref 3.8–5.2)
RBC # FLD: 16.8 % — HIGH (ref 10.3–14.5)
T PALLIDUM AB TITR SER: NEGATIVE — SIGNIFICANT CHANGE UP
WBC # BLD: 15.68 K/UL — HIGH (ref 3.8–10.5)
WBC # FLD AUTO: 15.68 K/UL — HIGH (ref 3.8–10.5)

## 2025-06-13 RX ORDER — OXYCODONE HYDROCHLORIDE 30 MG/1
5 TABLET ORAL
Refills: 0 | Status: DISCONTINUED | OUTPATIENT
Start: 2025-06-13 | End: 2025-06-14

## 2025-06-13 RX ORDER — IBUPROFEN 200 MG
600 TABLET ORAL EVERY 6 HOURS
Refills: 0 | Status: DISCONTINUED | OUTPATIENT
Start: 2025-06-13 | End: 2025-06-14

## 2025-06-13 RX ORDER — OXYCODONE HYDROCHLORIDE 30 MG/1
5 TABLET ORAL ONCE
Refills: 0 | Status: DISCONTINUED | OUTPATIENT
Start: 2025-06-13 | End: 2025-06-13

## 2025-06-13 RX ADMIN — Medication 80 MILLIGRAM(S): at 08:36

## 2025-06-13 RX ADMIN — Medication 975 MILLIGRAM(S): at 23:11

## 2025-06-13 RX ADMIN — OXYCODONE HYDROCHLORIDE 5 MILLIGRAM(S): 30 TABLET ORAL at 18:15

## 2025-06-13 RX ADMIN — HEPARIN SODIUM 5000 UNIT(S): 1000 INJECTION INTRAVENOUS; SUBCUTANEOUS at 17:29

## 2025-06-13 RX ADMIN — OXYCODONE HYDROCHLORIDE 5 MILLIGRAM(S): 30 TABLET ORAL at 17:39

## 2025-06-13 RX ADMIN — Medication 975 MILLIGRAM(S): at 12:30

## 2025-06-13 RX ADMIN — KETOROLAC TROMETHAMINE 30 MILLIGRAM(S): 30 INJECTION, SOLUTION INTRAMUSCULAR; INTRAVENOUS at 09:22

## 2025-06-13 RX ADMIN — Medication 975 MILLIGRAM(S): at 17:29

## 2025-06-13 RX ADMIN — KETOROLAC TROMETHAMINE 30 MILLIGRAM(S): 30 INJECTION, SOLUTION INTRAMUSCULAR; INTRAVENOUS at 14:13

## 2025-06-13 RX ADMIN — OXYCODONE HYDROCHLORIDE 5 MILLIGRAM(S): 30 TABLET ORAL at 13:30

## 2025-06-13 RX ADMIN — KETOROLAC TROMETHAMINE 30 MILLIGRAM(S): 30 INJECTION, SOLUTION INTRAMUSCULAR; INTRAVENOUS at 08:22

## 2025-06-13 RX ADMIN — KETOROLAC TROMETHAMINE 30 MILLIGRAM(S): 30 INJECTION, SOLUTION INTRAMUSCULAR; INTRAVENOUS at 15:00

## 2025-06-13 RX ADMIN — OXYCODONE HYDROCHLORIDE 5 MILLIGRAM(S): 30 TABLET ORAL at 12:49

## 2025-06-13 RX ADMIN — Medication 975 MILLIGRAM(S): at 11:47

## 2025-06-13 RX ADMIN — Medication 975 MILLIGRAM(S): at 23:41

## 2025-06-13 RX ADMIN — Medication 600 MILLIGRAM(S): at 21:59

## 2025-06-13 RX ADMIN — Medication 975 MILLIGRAM(S): at 18:15

## 2025-06-13 RX ADMIN — HEPARIN SODIUM 5000 UNIT(S): 1000 INJECTION INTRAVENOUS; SUBCUTANEOUS at 05:19

## 2025-06-13 RX ADMIN — NALBUPHINE HYDROCHLORIDE 2.5 MILLIGRAM(S): 10 INJECTION INTRAMUSCULAR; INTRAVENOUS; SUBCUTANEOUS at 05:17

## 2025-06-13 RX ADMIN — Medication 600 MILLIGRAM(S): at 21:29

## 2025-06-13 RX ADMIN — Medication 80 MILLIGRAM(S): at 14:14

## 2025-06-14 ENCOUNTER — TRANSCRIPTION ENCOUNTER (OUTPATIENT)
Age: 35
End: 2025-06-14

## 2025-06-14 VITALS
DIASTOLIC BLOOD PRESSURE: 75 MMHG | TEMPERATURE: 97 F | OXYGEN SATURATION: 97 % | SYSTOLIC BLOOD PRESSURE: 117 MMHG | RESPIRATION RATE: 18 BRPM | HEART RATE: 88 BPM

## 2025-06-14 PROCEDURE — 59025 FETAL NON-STRESS TEST: CPT

## 2025-06-14 PROCEDURE — 86900 BLOOD TYPING SEROLOGIC ABO: CPT

## 2025-06-14 PROCEDURE — 86780 TREPONEMA PALLIDUM: CPT

## 2025-06-14 PROCEDURE — 86850 RBC ANTIBODY SCREEN: CPT

## 2025-06-14 PROCEDURE — 88307 TISSUE EXAM BY PATHOLOGIST: CPT

## 2025-06-14 PROCEDURE — 59050 FETAL MONITOR W/REPORT: CPT

## 2025-06-14 PROCEDURE — 85025 COMPLETE CBC W/AUTO DIFF WBC: CPT

## 2025-06-14 PROCEDURE — 86870 RBC ANTIBODY IDENTIFICATION: CPT

## 2025-06-14 PROCEDURE — 86901 BLOOD TYPING SEROLOGIC RH(D): CPT

## 2025-06-14 PROCEDURE — 85460 HEMOGLOBIN FETAL: CPT

## 2025-06-14 RX ORDER — DOCUSATE SODIUM 100 MG
1 CAPSULE ORAL
Qty: 0 | Refills: 0 | DISCHARGE

## 2025-06-14 RX ORDER — IBUPROFEN 200 MG
3 TABLET ORAL
Qty: 0 | Refills: 0 | DISCHARGE

## 2025-06-14 RX ORDER — PRENATAL 136/IRON/FOLIC ACID 27 MG-1 MG
1 TABLET ORAL
Qty: 0 | Refills: 0 | DISCHARGE

## 2025-06-14 RX ORDER — FERROUS SULFATE 137(45) MG
1 TABLET, EXTENDED RELEASE ORAL
Qty: 0 | Refills: 0 | DISCHARGE

## 2025-06-14 RX ORDER — SIMETHICONE 80 MG
1 TABLET,CHEWABLE ORAL
Qty: 0 | Refills: 0 | DISCHARGE
Start: 2025-06-14

## 2025-06-14 RX ORDER — ACETAMINOPHEN 500 MG/5ML
2 LIQUID (ML) ORAL
Qty: 0 | Refills: 0 | DISCHARGE

## 2025-06-14 RX ORDER — MODIFIED LANOLIN 100 %
1 CREAM (GRAM) TOPICAL
Qty: 0 | Refills: 0 | DISCHARGE
Start: 2025-06-14

## 2025-06-14 RX ADMIN — HEPARIN SODIUM 5000 UNIT(S): 1000 INJECTION INTRAVENOUS; SUBCUTANEOUS at 06:33

## 2025-06-14 RX ADMIN — Medication 975 MILLIGRAM(S): at 12:02

## 2025-06-14 RX ADMIN — Medication 600 MILLIGRAM(S): at 08:33

## 2025-06-14 RX ADMIN — MAGNESIUM HYDROXIDE 30 MILLILITER(S): 400 SUSPENSION ORAL at 08:40

## 2025-06-14 RX ADMIN — Medication 975 MILLIGRAM(S): at 06:33

## 2025-06-14 RX ADMIN — Medication 80 MILLIGRAM(S): at 06:34

## 2025-06-20 ENCOUNTER — APPOINTMENT (OUTPATIENT)
Age: 35
End: 2025-06-20

## 2025-07-10 LAB — SURGICAL PATHOLOGY STUDY: SIGNIFICANT CHANGE UP
